# Patient Record
Sex: FEMALE | Race: WHITE | Employment: OTHER | URBAN - METROPOLITAN AREA
[De-identification: names, ages, dates, MRNs, and addresses within clinical notes are randomized per-mention and may not be internally consistent; named-entity substitution may affect disease eponyms.]

---

## 2017-03-13 ENCOUNTER — TELEPHONE (OUTPATIENT)
Dept: INTERNAL MEDICINE CLINIC | Facility: CLINIC | Age: 52
End: 2017-03-13

## 2017-03-13 NOTE — TELEPHONE ENCOUNTER
Pt called in requesting a copy of her px to please be mailed to her. (Address on file verified). Pt is asking if this can be done asap. Pt also asking for a call back once the px is placed in the mailbox, please.

## 2018-11-13 ENCOUNTER — MED REC SCAN ONLY (OUTPATIENT)
Dept: INTERNAL MEDICINE CLINIC | Facility: CLINIC | Age: 53
End: 2018-11-13

## 2019-03-12 ENCOUNTER — OFFICE VISIT (OUTPATIENT)
Dept: INTERNAL MEDICINE CLINIC | Facility: CLINIC | Age: 54
End: 2019-03-12
Payer: COMMERCIAL

## 2019-03-12 VITALS
BODY MASS INDEX: 33.21 KG/M2 | DIASTOLIC BLOOD PRESSURE: 74 MMHG | TEMPERATURE: 98 F | SYSTOLIC BLOOD PRESSURE: 112 MMHG | WEIGHT: 211.63 LBS | HEIGHT: 67 IN | HEART RATE: 82 BPM

## 2019-03-12 DIAGNOSIS — Z00.00 PHYSICAL EXAM: Primary | ICD-10-CM

## 2019-03-12 DIAGNOSIS — Z12.31 VISIT FOR SCREENING MAMMOGRAM: ICD-10-CM

## 2019-03-12 DIAGNOSIS — Z12.11 SCREENING FOR COLON CANCER: ICD-10-CM

## 2019-03-12 DIAGNOSIS — Z12.4 CERVICAL CANCER SCREENING: ICD-10-CM

## 2019-03-12 DIAGNOSIS — R10.11 RUQ PAIN: ICD-10-CM

## 2019-03-12 PROCEDURE — 99396 PREV VISIT EST AGE 40-64: CPT | Performed by: INTERNAL MEDICINE

## 2019-03-13 LAB — HPV I/H RISK 1 DNA SPEC QL NAA+PROBE: NEGATIVE

## 2019-03-22 NOTE — PROGRESS NOTES
HPI:    Patient ID: Mukul Hamilton is a 47year old female.     HPI  Here for physical exam  Due for colonoscopy mammogram   Brother diagnosed with colon cancer prior to age 48    /74 (BP Location: Right arm, Patient Position: Sitting, Cuff Size: large) Maternal Grandmother    • Other (Other [Other]) Paternal Grandmother         Parkinson's disease   • Heart Disease Maternal Grandfather         CAD   • Heart Disease Maternal Grandmother         CAD   • Arthritis Father         rheumatoid      Social Histo ASSESSMENT/PLAN:   (Z00.00) Physical exam  (primary encounter diagnosis)  Plan: ASSAY, THYROID STIM HORMONE, FREE T4 (FREE         THYROXINE), LIPID PANEL, CBC, PLATELET; NO         DIFFERENTIAL, COMP METABOLIC PANEL (14),         HEMOGLOBIN A1C, URINE M

## 2019-04-17 ENCOUNTER — APPOINTMENT (OUTPATIENT)
Dept: LAB | Age: 54
End: 2019-04-17
Attending: INTERNAL MEDICINE
Payer: COMMERCIAL

## 2019-04-17 DIAGNOSIS — Z00.00 PHYSICAL EXAM: ICD-10-CM

## 2019-04-17 PROCEDURE — 84439 ASSAY OF FREE THYROXINE: CPT

## 2019-04-17 PROCEDURE — 93005 ELECTROCARDIOGRAM TRACING: CPT

## 2019-04-17 PROCEDURE — 83036 HEMOGLOBIN GLYCOSYLATED A1C: CPT

## 2019-04-17 PROCEDURE — 81015 MICROSCOPIC EXAM OF URINE: CPT

## 2019-04-17 PROCEDURE — 84443 ASSAY THYROID STIM HORMONE: CPT

## 2019-04-17 PROCEDURE — 85027 COMPLETE CBC AUTOMATED: CPT

## 2019-04-17 PROCEDURE — 36415 COLL VENOUS BLD VENIPUNCTURE: CPT

## 2019-04-17 PROCEDURE — 80053 COMPREHEN METABOLIC PANEL: CPT

## 2019-04-17 PROCEDURE — 80061 LIPID PANEL: CPT

## 2019-04-17 PROCEDURE — 93010 ELECTROCARDIOGRAM REPORT: CPT | Performed by: INTERNAL MEDICINE

## 2019-04-20 ENCOUNTER — HOSPITAL ENCOUNTER (OUTPATIENT)
Dept: MAMMOGRAPHY | Age: 54
Discharge: HOME | End: 2019-04-20
Attending: INTERNAL MEDICINE
Payer: COMMERCIAL

## 2019-04-20 DIAGNOSIS — Z12.31 VISIT FOR SCREENING MAMMOGRAM: ICD-10-CM

## 2019-04-20 PROCEDURE — 77067 SCR MAMMO BI INCL CAD: CPT | Performed by: INTERNAL MEDICINE

## 2019-04-20 PROCEDURE — 77063 BREAST TOMOSYNTHESIS BI: CPT | Performed by: INTERNAL MEDICINE

## 2019-04-22 ENCOUNTER — HOSPITAL ENCOUNTER (OUTPATIENT)
Dept: ULTRASOUND IMAGING | Age: 54
Discharge: HOME | End: 2019-04-22
Attending: INTERNAL MEDICINE
Payer: COMMERCIAL

## 2019-04-22 DIAGNOSIS — R10.11 RUQ PAIN: ICD-10-CM

## 2019-04-22 PROCEDURE — 76705 ECHO EXAM OF ABDOMEN: CPT | Performed by: INTERNAL MEDICINE

## 2019-04-30 ENCOUNTER — HOSPITAL ENCOUNTER (OUTPATIENT)
Dept: MAMMOGRAPHY | Facility: HOSPITAL | Age: 54
Discharge: HOME | End: 2019-04-30
Attending: INTERNAL MEDICINE
Payer: COMMERCIAL

## 2019-04-30 DIAGNOSIS — R92.8 ABNORMAL MAMMOGRAM: ICD-10-CM

## 2019-04-30 PROCEDURE — 77065 DX MAMMO INCL CAD UNI: CPT | Performed by: INTERNAL MEDICINE

## 2019-04-30 PROCEDURE — 77061 BREAST TOMOSYNTHESIS UNI: CPT | Performed by: INTERNAL MEDICINE

## 2020-02-06 ENCOUNTER — HOSPITAL ENCOUNTER (OUTPATIENT)
Age: 55
Discharge: ANOTHER HOSPITAL | End: 2020-02-06
Attending: EMERGENCY MEDICINE
Payer: COMMERCIAL

## 2020-02-06 VITALS
SYSTOLIC BLOOD PRESSURE: 154 MMHG | WEIGHT: 211 LBS | RESPIRATION RATE: 20 BRPM | BODY MASS INDEX: 33 KG/M2 | HEART RATE: 73 BPM | TEMPERATURE: 99 F | OXYGEN SATURATION: 99 % | DIASTOLIC BLOOD PRESSURE: 81 MMHG

## 2020-02-06 DIAGNOSIS — R07.89 CHEST PRESSURE: Primary | ICD-10-CM

## 2020-02-06 DIAGNOSIS — J11.1 INFLUENZA: ICD-10-CM

## 2020-02-06 DIAGNOSIS — R00.2 PALPITATIONS: ICD-10-CM

## 2020-02-06 LAB
POCT INFLUENZA A: POSITIVE
POCT INFLUENZA B: NEGATIVE

## 2020-02-06 PROCEDURE — 99214 OFFICE O/P EST MOD 30 MIN: CPT

## 2020-02-06 PROCEDURE — 93010 ELECTROCARDIOGRAM REPORT: CPT

## 2020-02-06 PROCEDURE — 93005 ELECTROCARDIOGRAM TRACING: CPT

## 2020-02-06 PROCEDURE — 99204 OFFICE O/P NEW MOD 45 MIN: CPT

## 2020-02-06 PROCEDURE — 93010 ELECTROCARDIOGRAM REPORT: CPT | Performed by: EMERGENCY MEDICINE

## 2020-02-06 PROCEDURE — 87502 INFLUENZA DNA AMP PROBE: CPT | Performed by: EMERGENCY MEDICINE

## 2020-02-06 NOTE — ED INITIAL ASSESSMENT (HPI)
C/o fever  For 4 days  Heart feels \"fluttery\"  Started today  With chest pressure but denies chest pain

## 2020-02-06 NOTE — ED PROVIDER NOTES
Patient Seen in: [facility] In [address] [place] [address]      History   Patient presents with:  Fever    Stated Complaint: flu sx    HPI  Started with a tactile fever cough runny nose body aches approximately 5 days ago. Cough is been mild and dry. Mouth/Throat:      Mouth: Mucous membranes are moist.      Pharynx: No oropharyngeal exudate or posterior oropharyngeal erythema.    Eyes:      Conjunctiva/sclera: Conjunctivae normal.   Neck:      Musculoskeletal: Normal range of motion and neck suppl She insists on driving herself. She would like to go to [place]. She was offered recommendations for closer hospitals but she declined. Patient states she is calling her  now.           Disposition and Plan     Clinical Impression:  Chest pressure

## 2020-02-18 ENCOUNTER — TELEPHONE (OUTPATIENT)
Dept: OTHER | Age: 55
End: 2020-02-18

## 2020-02-18 NOTE — TELEPHONE ENCOUNTER
Pt calling to get a f/u appt with Dr. Brittney Cruz for elevated BP and pos. Flu. Pt states BP today 132/91. HR 79. BP at  220 and 180 pt sent to ED where she was monitored. Denies any other symptoms. Advised to make appt to be evaluated for BP.  Pt agreed to

## 2020-02-19 ENCOUNTER — OFFICE VISIT (OUTPATIENT)
Dept: INTERNAL MEDICINE CLINIC | Facility: CLINIC | Age: 55
End: 2020-02-19
Payer: COMMERCIAL

## 2020-02-19 ENCOUNTER — TELEPHONE (OUTPATIENT)
Dept: OTHER | Age: 55
End: 2020-02-19

## 2020-02-19 ENCOUNTER — HOSPITAL ENCOUNTER (OUTPATIENT)
Dept: GENERAL RADIOLOGY | Age: 55
Discharge: HOME | End: 2020-02-19
Attending: INTERNAL MEDICINE
Payer: COMMERCIAL

## 2020-02-19 VITALS
HEART RATE: 80 BPM | SYSTOLIC BLOOD PRESSURE: 133 MMHG | WEIGHT: 210 LBS | DIASTOLIC BLOOD PRESSURE: 83 MMHG | HEIGHT: 67 IN | BODY MASS INDEX: 32.96 KG/M2 | TEMPERATURE: 98 F | OXYGEN SATURATION: 98 %

## 2020-02-19 DIAGNOSIS — R03.0 ELEVATED BLOOD PRESSURE READING: ICD-10-CM

## 2020-02-19 DIAGNOSIS — J11.1 INFLUENZA: Primary | ICD-10-CM

## 2020-02-19 DIAGNOSIS — R05.9 COUGH: ICD-10-CM

## 2020-02-19 PROBLEM — I10 HYPERTENSION GOAL BP (BLOOD PRESSURE) < 130/80: Status: ACTIVE | Noted: 2020-02-19

## 2020-02-19 PROCEDURE — 71046 X-RAY EXAM CHEST 2 VIEWS: CPT | Performed by: INTERNAL MEDICINE

## 2020-02-19 PROCEDURE — 99213 OFFICE O/P EST LOW 20 MIN: CPT | Performed by: INTERNAL MEDICINE

## 2020-02-19 RX ORDER — ALBUTEROL SULFATE 90 UG/1
2 AEROSOL, METERED RESPIRATORY (INHALATION) EVERY 4 HOURS PRN
Qty: 1 INHALER | Refills: 6 | Status: SHIPPED | OUTPATIENT
Start: 2020-02-19 | End: 2020-02-19

## 2020-02-19 RX ORDER — ALBUTEROL SULFATE 90 UG/1
2 AEROSOL, METERED RESPIRATORY (INHALATION) EVERY 4 HOURS PRN
Qty: 1 INHALER | Refills: 6 | Status: CANCELLED | OUTPATIENT
Start: 2020-02-19

## 2020-02-19 RX ORDER — CIPROFLOXACIN HYDROCHLORIDE 3.5 MG/ML
SOLUTION/ DROPS TOPICAL
COMMUNITY
Start: 2020-02-15 | End: 2020-02-22

## 2020-02-19 RX ORDER — ALBUTEROL SULFATE 90 UG/1
2 AEROSOL, METERED RESPIRATORY (INHALATION) EVERY 4 HOURS PRN
Qty: 1 INHALER | Refills: 6 | Status: SHIPPED | OUTPATIENT
Start: 2020-02-19 | End: 2020-08-18

## 2020-02-19 NOTE — PATIENT INSTRUCTIONS
1. Please purchase a blood pressure monitor, if you don't already own one, and record your blood pressure and heart rate 1-2 times daily on the provided log.  The more values you provide at the end of the month the easier it will be to adjust your medic High blood pressure occurs when blood pushes too hard against artery walls. This causes damage to the artery walls and then the formation of scar tissue as it heals. This makes the arteries stiff and weak.  Plaque sticks to the scarred tissue narrowing and An example of a blood pressure measurement is 120/70 (120 over 70). The top number is the pressure of blood against the artery walls during a heartbeat (systolic).  The bottom number is the pressure of blood against artery walls between heartbeats (diastoli · Limit alcohol. Drinking too much alcohol can raise blood pressure. Men should have no more than 2 drinks a day. Women should have no more than 1. (A drink is equal to 1 beer, or a small glass of wine, or a shot of liquor.)  · Control stress.  Stress makes Blood pressure measurements are given as 2 numbers. Systolic blood pressure is the upper number. This is the pressure when the heart contracts. Diastolic blood pressure is the lower number. This is the pressure when the heart relaxes between beats.  You ga · Start an exercise program. Talk with your healthcare provider about what exercise program is best for you. It doesn’t have to be difficult. Even brisk walking for 20 minutes 3 times a week is a good form of exercise.   · Avoid medicines that stimulates th · Relax for 5 minutes before taking the measurement. · Sit correctly. Be sure your back is supported. Don't sit on a couch or soft chair. Uncross your feet and place them flat on the floor.  Place your arm on a solid, flat surface like a table with the upp · Dizziness or dizziness with spinning sensation (vertigo)  · Weakness in an arm or leg or on one side of the face  · Trouble speaking or seeing   Controlling High Blood Pressure  High blood pressure (hypertension) is often called the silent killer.  This i · When you go to a restaurant, ask that your meal be prepared with no added salt. Maintain a healthy weight  · Ask your healthcare provider how many calories to eat a day. Then stick to that number.   · Ask your healthcare provider what weight range is h Eating salt (sodium) can make your body retain too much water. Excess water makes your heart work harder. Canned, packaged, and frozen foods are easy to prepare. But they are often high in sodium.  Here are some ideas for low-salt foods you can easily make

## 2020-02-19 NOTE — PROGRESS NOTES
History of Present Illness   Patient ID: Mine Chow is a 49year old female.   Chief Complaint: Hospital F/U [facility] ER ffup- Chest pain, influenza, HTN. )    PMH mitral valve prolapse  2/6/2020 patient seen in urgent care with complaints of fever, Eyes: Conjunctivae are normal. Right eye exhibits no discharge. Left eye exhibits no discharge. Neck: Neck supple. Cardiovascular: Normal rate, regular rhythm, normal heart sounds and intact distal pulses. Exam reveals no gallop. No murmur heard. Tobacco smoker: No      Systolic Blood Pressure: 585 mmHg      Is BP treated: No      HDL Cholesterol: 57 mg/dL      Total Cholesterol: 166 mg/dL    Medical History    Reviewed Active Problems:  Patient Active Problem List    Elevated blood pressure re Overall improving, flu symptoms are starting to resolve. She does complain of a cough and is concerned about pneumonia and would like a chest x-ray. Patient reassured but will order chest x-ray for comfort.   Continue with symptomatic treatment, given pro 3. Your blood pressure goal is top number 120-130 mmHg  and bottom number 70-80 mmHg; ie 120-130 mmHg / 70-80 mmHg.   Blood pressure that stays consistently within this range reduces your risk of stroke, heart attack, heart disease, kidney disease, eye dise It's important to know your blood pressure numbers. Blood pressure measurements are given as 2 numbers. Systolic blood pressure is the upper number. This is the pressure when the heart contracts. Diastolic blood pressure is the lower number.  This is the pr · Choose heart-healthy foods. Eating healthier meals helps you control your blood pressure. Ask your doctor about the DASH eating plan. This plan helps reduce blood pressure by limiting the amount of sodium (salt) you have in your diet.  DASH also encourage · Medicine is only one part of controlling high blood pressure. You also need to manage your weight, get regular exercise, and adjust your eating habits. · High blood pressure is usually a lifelong problem.  But it can be controlled with healthy lifestyle · Stage 2 high blood pressure is when systolic is 443 or higher or the diastolic is 90 or higher  Uncontrolled high blood pressure can cause serious health problems. It raises your risk for heart attack, stroke, and heart failure.  In general, if you have h · Learn how to handle stress better. This is an important part of any program to lower blood pressure. Learn ways to relax. These include meditation, yoga, and biofeedback. · If medicines were prescribed, take them exactly as directed.  Missing doses may c · Call your provider if you have several high readings. Don't be frightened by a single high reading, but if you get several high readings, check in with your healthcare provider.   · Note: When blood pressure reaches a systolic (top number) of 687 or highe Blood pressure is categorized as normal, elevated, or stage 1 or stage 2 high blood pressure:  · Normal blood pressure is systolic of less than 593 and diastolic of less than 80 (120/80)  · Elevated blood pressure is systolic of 607 to 180 and diastolic le · Be active at a moderate to vigorous level of physical activity for at least 40 minutes for a minimum of 3 to 4 days a week. Manage stress  · Make time to relax and enjoy life. Find time to laugh.   · Communicate your concerns with your loved ones and · Unsalted fresh fruit and vegetables, or frozen or canned fruit and vegetables with no added salt  Stay away from:  · Lunch or deli meat that is cured or smoked  · Cheese  · Tomato juice and ketchup  · Canned vegetables, soups, and fish not labeled as no-

## 2020-02-19 NOTE — TELEPHONE ENCOUNTER
Verified name and .   Results/recommendations reviewed with pt and pt verb understanding                    Notes recorded by Abimbola Muller on 2020 at 2:45 PM CST  Called pt and lmtcb.  ------    Notes recorded by Catina Reilly MD on 2020 a

## 2020-02-20 ENCOUNTER — TELEPHONE (OUTPATIENT)
Dept: INTERNAL MEDICINE CLINIC | Facility: CLINIC | Age: 55
End: 2020-02-20

## 2020-02-20 DIAGNOSIS — Z12.31 VISIT FOR SCREENING MAMMOGRAM: Primary | ICD-10-CM

## 2020-02-20 NOTE — TELEPHONE ENCOUNTER
I had called pt regarding cxr results, pt was already aware. Dr. Sue Edouard Pt would like to know if she can have Mammogram order she is due in April and will schedule then. Pt would also like any annual orders ordered.      Last Mammo 4/24/2019   Notes rec

## 2020-02-21 NOTE — TELEPHONE ENCOUNTER
Pt is also requesting lab orders . Please advise , Pt does not have a Px appointment scheduled yet .

## 2020-02-24 NOTE — TELEPHONE ENCOUNTER
Attempted to reach patient no answer, voicemail left informing patient labs and mammogram have been ordered.

## 2020-03-23 ENCOUNTER — NURSE TRIAGE (OUTPATIENT)
Dept: INTERNAL MEDICINE CLINIC | Facility: CLINIC | Age: 55
End: 2020-03-23

## 2020-03-23 DIAGNOSIS — R07.89 CHEST PRESSURE: ICD-10-CM

## 2020-03-23 DIAGNOSIS — F41.9 ANXIETY: ICD-10-CM

## 2020-03-23 DIAGNOSIS — I49.1 PREMATURE ATRIAL CONTRACTIONS: ICD-10-CM

## 2020-03-23 DIAGNOSIS — I10 HYPERTENSION GOAL BP (BLOOD PRESSURE) < 130/80: Primary | ICD-10-CM

## 2020-03-23 PROCEDURE — 99443 PHONE E/M BY PHYS 21-30 MIN: CPT | Performed by: INTERNAL MEDICINE

## 2020-03-23 NOTE — TELEPHONE ENCOUNTER
Action Requested: Summary for Provider     []  Critical Lab, Recommendations Needed  [x] Need Additional Advice  []   FYI    []   Need Orders  [] Need Medications Sent to Pharmacy  []  Other     SUMMARY: Patient calling with complaint of intermittent chest

## 2020-03-24 PROBLEM — F41.9 ANXIETY: Status: ACTIVE | Noted: 2020-03-24

## 2020-03-24 PROBLEM — I10 HYPERTENSION GOAL BP (BLOOD PRESSURE) < 130/80: Status: ACTIVE | Noted: 2020-03-24

## 2020-03-24 PROBLEM — I49.1 PREMATURE ATRIAL CONTRACTIONS: Status: ACTIVE | Noted: 2020-03-24

## 2020-03-24 RX ORDER — AMLODIPINE BESYLATE 5 MG/1
5 TABLET ORAL NIGHTLY
Qty: 90 TABLET | Refills: 1 | Status: SHIPPED | OUTPATIENT
Start: 2020-03-24 | End: 2020-08-20

## 2020-03-24 NOTE — TELEPHONE ENCOUNTER
Pt calling back states has not gotten the messages from [address] states was by mobile phone all night and did not notice any VM. Advised pt to check volume make sure she is available pt states she will. Mobile # [phone].

## 2020-03-24 NOTE — TELEPHONE ENCOUNTER
Virtual/Telephone Check-In    Tanja Keyannajenna Manuelerrol Eda verbally consents to a Virtual/Telephone Check-In service on 03/24/20. Patient understands and accepts financial responsibility for any deductible, co-insurance and/or co-pays associated with this service. light midsternal nonradiating chest pressure, \"cat sitting on my chest \", intermittent, occurs randomly, no relation to activity, no leg swelling, no other symptoms, no relation to elevated blood pressure.  She feels anxiety/chest pressure when she thinks 3. Anxiety  4. Chest pressure  Plan  New problem. Discussed relaxation techniques which she is agreeable to. ffup as needed, offered limited dose xanax to see if that helps her symptoms but she politely declines.   Advised chest pressure could be due t

## 2020-08-18 DIAGNOSIS — I10 HYPERTENSION GOAL BP (BLOOD PRESSURE) < 130/80: ICD-10-CM

## 2020-08-18 DIAGNOSIS — R05.9 COUGH: ICD-10-CM

## 2020-08-19 ENCOUNTER — LAB ENCOUNTER (OUTPATIENT)
Dept: LAB | Facility: REFERENCE LAB | Age: 55
End: 2020-08-19
Attending: INTERNAL MEDICINE
Payer: COMMERCIAL

## 2020-08-19 ENCOUNTER — PATIENT MESSAGE (OUTPATIENT)
Dept: OTHER | Age: 55
End: 2020-08-19

## 2020-08-19 ENCOUNTER — NURSE TRIAGE (OUTPATIENT)
Dept: INTERNAL MEDICINE CLINIC | Facility: CLINIC | Age: 55
End: 2020-08-19

## 2020-08-19 DIAGNOSIS — Z00.00 PHYSICAL EXAM: ICD-10-CM

## 2020-08-19 LAB
ALBUMIN SERPL-MCNC: 3.5 G/DL (ref 3.4–5)
ALBUMIN/GLOB SERPL: 0.9 {RATIO} (ref 1–2)
ALP LIVER SERPL-CCNC: 75 U/L (ref 41–108)
ALT SERPL-CCNC: 18 U/L (ref 13–56)
ANION GAP SERPL CALC-SCNC: 3 MMOL/L (ref 0–18)
AST SERPL-CCNC: 10 U/L (ref 15–37)
BACTERIA UR QL AUTO: NEGATIVE /HPF
BILIRUB SERPL-MCNC: 0.4 MG/DL (ref 0.1–2)
BUN BLD-MCNC: 13 MG/DL (ref 7–18)
BUN/CREAT SERPL: 15.5 (ref 10–20)
CALCIUM BLD-MCNC: 9.3 MG/DL (ref 8.5–10.1)
CHLORIDE SERPL-SCNC: 108 MMOL/L (ref 98–112)
CHOLEST SMN-MCNC: 174 MG/DL (ref ?–200)
CO2 SERPL-SCNC: 30 MMOL/L (ref 21–32)
CREAT BLD-MCNC: 0.84 MG/DL (ref 0.55–1.02)
DEPRECATED RDW RBC AUTO: 40.4 FL (ref 35.1–46.3)
ERYTHROCYTE [DISTWIDTH] IN BLOOD BY AUTOMATED COUNT: 12.6 % (ref 11–15)
EST. AVERAGE GLUCOSE BLD GHB EST-MCNC: 100 MG/DL (ref 68–126)
GLOBULIN PLAS-MCNC: 3.8 G/DL (ref 2.8–4.4)
GLUCOSE BLD-MCNC: 85 MG/DL (ref 70–99)
HBA1C MFR BLD HPLC: 5.1 % (ref ?–5.7)
HCT VFR BLD AUTO: 37.8 % (ref 35–48)
HDLC SERPL-MCNC: 49 MG/DL (ref 40–59)
HGB BLD-MCNC: 12.6 G/DL (ref 12–16)
LDLC SERPL CALC-MCNC: 107 MG/DL (ref ?–100)
M PROTEIN MFR SERPL ELPH: 7.3 G/DL (ref 6.4–8.2)
MCH RBC QN AUTO: 29.3 PG (ref 26–34)
MCHC RBC AUTO-ENTMCNC: 33.3 G/DL (ref 31–37)
MCV RBC AUTO: 87.9 FL (ref 80–100)
NONHDLC SERPL-MCNC: 125 MG/DL (ref ?–130)
OSMOLALITY SERPL CALC.SUM OF ELEC: 291 MOSM/KG (ref 275–295)
PATIENT FASTING Y/N/NP: YES
PATIENT FASTING Y/N/NP: YES
PLATELET # BLD AUTO: 290 10(3)UL (ref 150–450)
POTASSIUM SERPL-SCNC: 4.1 MMOL/L (ref 3.5–5.1)
RBC # BLD AUTO: 4.3 X10(6)UL (ref 3.8–5.3)
RBC #/AREA URNS AUTO: 1 /HPF
SODIUM SERPL-SCNC: 141 MMOL/L (ref 136–145)
T4 FREE SERPL-MCNC: 1 NG/DL (ref 0.8–1.7)
TRIGL SERPL-MCNC: 89 MG/DL (ref 30–149)
TSI SER-ACNC: 4.57 MIU/ML (ref 0.36–3.74)
VLDLC SERPL CALC-MCNC: 18 MG/DL (ref 0–30)
WBC # BLD AUTO: 7.7 X10(3) UL (ref 4–11)
WBC #/AREA URNS AUTO: 3 /HPF

## 2020-08-19 PROCEDURE — 85027 COMPLETE CBC AUTOMATED: CPT

## 2020-08-19 PROCEDURE — 36415 COLL VENOUS BLD VENIPUNCTURE: CPT

## 2020-08-19 PROCEDURE — 83036 HEMOGLOBIN GLYCOSYLATED A1C: CPT

## 2020-08-19 PROCEDURE — 80061 LIPID PANEL: CPT

## 2020-08-19 PROCEDURE — 84443 ASSAY THYROID STIM HORMONE: CPT

## 2020-08-19 PROCEDURE — 80053 COMPREHEN METABOLIC PANEL: CPT

## 2020-08-19 PROCEDURE — 81015 MICROSCOPIC EXAM OF URINE: CPT

## 2020-08-19 PROCEDURE — 84439 ASSAY OF FREE THYROXINE: CPT

## 2020-08-19 NOTE — TELEPHONE ENCOUNTER
Patient sent [employer] message stating I think I'm suffering a side effect of my blood pressure medicine (I have an itchy \"hot\" rash on the back of my head please advise

## 2020-08-20 ENCOUNTER — OFFICE VISIT (OUTPATIENT)
Dept: FAMILY MEDICINE CLINIC | Facility: CLINIC | Age: 55
End: 2020-08-20
Payer: COMMERCIAL

## 2020-08-20 VITALS
HEART RATE: 79 BPM | TEMPERATURE: 97 F | HEIGHT: 66.4 IN | WEIGHT: 202 LBS | SYSTOLIC BLOOD PRESSURE: 129 MMHG | DIASTOLIC BLOOD PRESSURE: 79 MMHG | RESPIRATION RATE: 16 BRPM | BODY MASS INDEX: 32.08 KG/M2

## 2020-08-20 DIAGNOSIS — I10 HYPERTENSION GOAL BP (BLOOD PRESSURE) < 130/80: ICD-10-CM

## 2020-08-20 DIAGNOSIS — R21 RASH AND NONSPECIFIC SKIN ERUPTION: ICD-10-CM

## 2020-08-20 PROCEDURE — 99213 OFFICE O/P EST LOW 20 MIN: CPT | Performed by: FAMILY MEDICINE

## 2020-08-20 PROCEDURE — 3008F BODY MASS INDEX DOCD: CPT | Performed by: FAMILY MEDICINE

## 2020-08-20 PROCEDURE — 3078F DIAST BP <80 MM HG: CPT | Performed by: FAMILY MEDICINE

## 2020-08-20 PROCEDURE — 3074F SYST BP LT 130 MM HG: CPT | Performed by: FAMILY MEDICINE

## 2020-08-20 RX ORDER — AMLODIPINE BESYLATE 5 MG/1
5 TABLET ORAL NIGHTLY
Qty: 90 TABLET | Refills: 0 | Status: SHIPPED | OUTPATIENT
Start: 2020-08-20 | End: 2020-11-12

## 2020-08-20 RX ORDER — METHYLPREDNISOLONE 4 MG/1
TABLET ORAL
Qty: 1 KIT | Refills: 0 | Status: SHIPPED | OUTPATIENT
Start: 2020-08-20 | End: 2020-09-09 | Stop reason: ALTCHOICE

## 2020-08-20 RX ORDER — AMLODIPINE BESYLATE 5 MG/1
5 TABLET ORAL NIGHTLY
Qty: 90 TABLET | Refills: 1 | Status: CANCELLED | OUTPATIENT
Start: 2020-08-20

## 2020-08-20 NOTE — TELEPHONE ENCOUNTER
Patient forgot her amlodipine at her parents and needs a refill. Has not taken medication for 5 days. Please advise.

## 2020-08-20 NOTE — PROGRESS NOTES
HPI:    Patient ID: Lovely French is a 49year old female. Pt presents with an itchy rash on the back of the scalp for the last several weeks. No new topical agents or ingestions. Pt has tried otc remedies without consistent relief.  No fevers or ac placed in this encounter. Meds This Visit:  Requested Prescriptions     Signed Prescriptions Disp Refills   • methylPREDNISolone (MEDROL) 4 MG Oral Tablet Therapy Pack 1 kit 0     Sig: As directed.    • amLODIPine Besylate 5 MG Oral Tab 90 tablet 0

## 2020-08-20 NOTE — TELEPHONE ENCOUNTER
Action Requested: Summary for Provider     []  Critical Lab, Recommendations Needed  [] Need Additional Advice  [x]   FYI    []   Need Orders  [] Need Medications Sent to Pharmacy  []  Other     SUMMARY: For the past 1 month patient has had a rash in the b

## 2020-08-21 RX ORDER — ALBUTEROL SULFATE 90 UG/1
2 AEROSOL, METERED RESPIRATORY (INHALATION) EVERY 4 HOURS PRN
Qty: 1 INHALER | Refills: 6 | Status: SHIPPED | OUTPATIENT
Start: 2020-08-21

## 2020-08-25 ENCOUNTER — HOSPITAL ENCOUNTER (OUTPATIENT)
Dept: MAMMOGRAPHY | Age: 55
Discharge: HOME | End: 2020-08-25
Attending: INTERNAL MEDICINE
Payer: COMMERCIAL

## 2020-08-25 DIAGNOSIS — Z12.31 VISIT FOR SCREENING MAMMOGRAM: ICD-10-CM

## 2020-08-25 PROCEDURE — 77067 SCR MAMMO BI INCL CAD: CPT | Performed by: INTERNAL MEDICINE

## 2020-08-25 PROCEDURE — 77063 BREAST TOMOSYNTHESIS BI: CPT | Performed by: INTERNAL MEDICINE

## 2020-09-09 ENCOUNTER — OFFICE VISIT (OUTPATIENT)
Dept: INTERNAL MEDICINE CLINIC | Facility: CLINIC | Age: 55
End: 2020-09-09
Payer: COMMERCIAL

## 2020-09-09 VITALS
HEIGHT: 67 IN | TEMPERATURE: 98 F | BODY MASS INDEX: 32.02 KG/M2 | DIASTOLIC BLOOD PRESSURE: 76 MMHG | WEIGHT: 204 LBS | HEART RATE: 67 BPM | SYSTOLIC BLOOD PRESSURE: 116 MMHG

## 2020-09-09 DIAGNOSIS — Z12.11 COLON CANCER SCREENING: ICD-10-CM

## 2020-09-09 DIAGNOSIS — E03.9 HYPOTHYROIDISM, UNSPECIFIED TYPE: ICD-10-CM

## 2020-09-09 DIAGNOSIS — Z00.00 ROUTINE GENERAL MEDICAL EXAMINATION AT A HEALTH CARE FACILITY: Primary | ICD-10-CM

## 2020-09-09 DIAGNOSIS — R21 RASH: ICD-10-CM

## 2020-09-09 PROBLEM — R03.0 ELEVATED BLOOD PRESSURE READING: Status: RESOLVED | Noted: 2020-02-19 | Resolved: 2020-09-09

## 2020-09-09 PROCEDURE — 3074F SYST BP LT 130 MM HG: CPT | Performed by: INTERNAL MEDICINE

## 2020-09-09 PROCEDURE — 99396 PREV VISIT EST AGE 40-64: CPT | Performed by: INTERNAL MEDICINE

## 2020-09-09 PROCEDURE — 3078F DIAST BP <80 MM HG: CPT | Performed by: INTERNAL MEDICINE

## 2020-09-09 PROCEDURE — 3008F BODY MASS INDEX DOCD: CPT | Performed by: INTERNAL MEDICINE

## 2020-09-09 RX ORDER — LEVOTHYROXINE SODIUM 0.03 MG/1
25 TABLET ORAL
Qty: 90 TABLET | Refills: 1 | Status: SHIPPED | OUTPATIENT
Start: 2020-09-09 | End: 2021-03-13

## 2020-09-09 RX ORDER — TRIAMCINOLONE ACETONIDE 0.25 MG/ML
LOTION TOPICAL
Qty: 60 ML | Refills: 0 | Status: SHIPPED | OUTPATIENT
Start: 2020-09-09

## 2020-09-15 ENCOUNTER — MED REC SCAN ONLY (OUTPATIENT)
Dept: INTERNAL MEDICINE CLINIC | Facility: CLINIC | Age: 55
End: 2020-09-15

## 2020-09-20 NOTE — PROGRESS NOTES
HPI:    Patient ID: Jann Najera is a 49year old female.     HPI    Physical exam    Generally healthy    /76 (BP Location: Left arm, Patient Position: Sitting, Cuff Size: large)   Pulse 67   Temp 98 °F (36.7 °C) (Oral)   Ht 5' 7\" (1.702 m) Oral Tab Take 1 tablet (5 mg total) by mouth nightly. FOR BLOOD PRESSURE. 90 tablet 0   • Albuterol Sulfate HFA (PROAIR HFA) 108 (90 Base) MCG/ACT Inhalation Aero Soln Inhale 2 puffs into the lungs every 4 (four) hours as needed for Wheezing.  (Patient not distension (obese) and no mass. There is no abdominal tenderness. Genitourinary:    Genitourinary Comments: . Breast exam.  Bilateral symmetric  No discrete palpable masses or tenderness  No nipple discharge  And no axillary adenopathy.        Musculoskele

## 2020-10-23 ENCOUNTER — MED REC SCAN ONLY (OUTPATIENT)
Dept: INTERNAL MEDICINE CLINIC | Facility: CLINIC | Age: 55
End: 2020-10-23

## 2020-11-12 DIAGNOSIS — I10 HYPERTENSION GOAL BP (BLOOD PRESSURE) < 130/80: ICD-10-CM

## 2020-11-12 RX ORDER — AMLODIPINE BESYLATE 5 MG/1
5 TABLET ORAL NIGHTLY
Qty: 90 TABLET | Refills: 0 | Status: SHIPPED | OUTPATIENT
Start: 2020-11-12

## 2020-12-03 ENCOUNTER — NURSE TRIAGE (OUTPATIENT)
Dept: INTERNAL MEDICINE CLINIC | Facility: CLINIC | Age: 55
End: 2020-12-03

## 2020-12-03 NOTE — TELEPHONE ENCOUNTER
Action Requested: Summary for Provider     []  Critical Lab, Recommendations Needed  [x] Need Additional Advice  []   FYI    []   Need Orders  [x] Need Medications Sent to Pharmacy  []  Other     SUMMARY:   Reason for call: Dizziness  Onset: Data Unavailab

## 2021-02-15 ENCOUNTER — TELEPHONE (OUTPATIENT)
Dept: INTERNAL MEDICINE CLINIC | Facility: CLINIC | Age: 56
End: 2021-02-15

## 2021-02-16 ENCOUNTER — TELEPHONE (OUTPATIENT)
Dept: INTERNAL MEDICINE CLINIC | Facility: CLINIC | Age: 56
End: 2021-02-16

## 2021-02-16 NOTE — TELEPHONE ENCOUNTER
Called pt name and  verified informed pt dr Robert Molina had filled out forms and were ready for  on second floor reception. Patient  verbalized understanding per pt said she would either pick them today Tuesday, or .

## 2021-03-12 DIAGNOSIS — Z23 NEED FOR VACCINATION: ICD-10-CM

## 2021-03-13 DIAGNOSIS — E03.9 HYPOTHYROIDISM, UNSPECIFIED TYPE: ICD-10-CM

## 2021-03-13 RX ORDER — LEVOTHYROXINE SODIUM 0.03 MG/1
25 TABLET ORAL
Qty: 90 TABLET | Refills: 1 | Status: SHIPPED | OUTPATIENT
Start: 2021-03-13 | End: 2021-12-15

## 2021-06-17 ENCOUNTER — HOSPITAL ENCOUNTER (EMERGENCY)
Facility: HOSPITAL | Age: 56
Discharge: HOME | End: 2021-06-17
Attending: EMERGENCY MEDICINE
Payer: COMMERCIAL

## 2021-06-17 ENCOUNTER — APPOINTMENT (OUTPATIENT)
Dept: GENERAL RADIOLOGY | Facility: HOSPITAL | Age: 56
End: 2021-06-17
Attending: EMERGENCY MEDICINE
Payer: COMMERCIAL

## 2021-06-17 VITALS
RESPIRATION RATE: 14 BRPM | HEART RATE: 59 BPM | DIASTOLIC BLOOD PRESSURE: 86 MMHG | TEMPERATURE: 98 F | OXYGEN SATURATION: 100 % | SYSTOLIC BLOOD PRESSURE: 158 MMHG

## 2021-06-17 DIAGNOSIS — R07.89 CHEST PAIN, ATYPICAL: Primary | ICD-10-CM

## 2021-06-17 PROCEDURE — 80048 BASIC METABOLIC PNL TOTAL CA: CPT | Performed by: EMERGENCY MEDICINE

## 2021-06-17 PROCEDURE — 85379 FIBRIN DEGRADATION QUANT: CPT | Performed by: EMERGENCY MEDICINE

## 2021-06-17 PROCEDURE — 99284 EMERGENCY DEPT VISIT MOD MDM: CPT

## 2021-06-17 PROCEDURE — 93005 ELECTROCARDIOGRAM TRACING: CPT

## 2021-06-17 PROCEDURE — 71045 X-RAY EXAM CHEST 1 VIEW: CPT | Performed by: EMERGENCY MEDICINE

## 2021-06-17 PROCEDURE — 84484 ASSAY OF TROPONIN QUANT: CPT | Performed by: EMERGENCY MEDICINE

## 2021-06-17 PROCEDURE — 85025 COMPLETE CBC W/AUTO DIFF WBC: CPT | Performed by: EMERGENCY MEDICINE

## 2021-06-17 PROCEDURE — 36415 COLL VENOUS BLD VENIPUNCTURE: CPT

## 2021-06-17 PROCEDURE — 93010 ELECTROCARDIOGRAM REPORT: CPT | Performed by: INTERNAL MEDICINE

## 2021-06-17 RX ORDER — ACETAMINOPHEN 500 MG
1000 TABLET ORAL ONCE
Status: COMPLETED | OUTPATIENT
Start: 2021-06-17 | End: 2021-06-17

## 2021-06-17 RX ORDER — ACETAMINOPHEN 500 MG
TABLET ORAL
Status: COMPLETED
Start: 2021-06-17 | End: 2021-06-17

## 2021-06-17 NOTE — ED PROVIDER NOTES
Patient Seen in: [facility] Emergency Department      History   Patient presents with:  Chest Pain Angina    Stated Complaint: HYN/Chest Pressure    HPI/Subjective:   HPI    Patient is a 81-year-old female with a history of hypertension.   She stat Exam    Constitutional: Oriented to person, place, and time. Appears well-developed and well-nourished. HEENT:   Head: Normocephalic and atraumatic.    Right Ear: External ear normal.   Left Ear: External ear normal.   Nose: Nose normal.   Mouth/Throat: O XR CHEST AP PORTABLE  (CPT=71045)    Result Date: 6/17/2021  CONCLUSION:  1. No acute cardiopulmonary disease.     Dictated by (CST): No Shelby MD on 6/17/2021 at 6:52 PM     Finalized by (CST): No Shelby MD on 6/17/2021 at 6:57 P

## 2021-06-18 NOTE — ED QUICK NOTES
Pt provided with discharge instructions. Verbalized understanding for plan of care at home and follow up. All questions/concerns addressed prior to discharge. Iv removed, cathter intact.   Pt ambulatory out of ed with steady gait with family members

## 2021-08-13 ENCOUNTER — HOSPITAL ENCOUNTER (OUTPATIENT)
Age: 56
Discharge: HOME | End: 2021-08-13
Attending: EMERGENCY MEDICINE
Payer: COMMERCIAL

## 2021-08-13 VITALS
RESPIRATION RATE: 18 BRPM | DIASTOLIC BLOOD PRESSURE: 69 MMHG | SYSTOLIC BLOOD PRESSURE: 146 MMHG | TEMPERATURE: 98 F | OXYGEN SATURATION: 100 % | HEART RATE: 70 BPM

## 2021-08-13 DIAGNOSIS — R21 RASH OF MULTIPLE DIGITS OF BOTH HANDS: Primary | ICD-10-CM

## 2021-08-13 PROCEDURE — 99213 OFFICE O/P EST LOW 20 MIN: CPT

## 2021-08-13 RX ORDER — METHYLPREDNISOLONE 4 MG/1
TABLET ORAL
Qty: 1 EACH | Refills: 0 | Status: SHIPPED | OUTPATIENT
Start: 2021-08-13 | End: 2022-01-06 | Stop reason: ALTCHOICE

## 2021-08-13 NOTE — ED INITIAL ASSESSMENT (HPI)
Pt presents with a rash on both hands x 6 weeks. Pt states daughter diagnosed with poison ivy, had steroids and rash went away.

## 2021-08-13 NOTE — ED PROVIDER NOTES
Patient Seen in: [facility]      History   Patient presents with:  Rash Skin Problem    Stated Complaint: Poison Ivy on hands    HPI/Subjective:   HPI    48year old female with h/o htn, heart murmur who presents with suspected poison ivy to Cardiovascular:      Rate and Rhythm: Normal rate. Pulmonary:      Effort: Pulmonary effort is normal. No respiratory distress. Musculoskeletal:         General: No deformity. Normal range of motion.       Cervical back: Normal range of motion and nec

## 2021-10-20 ENCOUNTER — TELEPHONE (OUTPATIENT)
Dept: INTERNAL MEDICINE CLINIC | Facility: CLINIC | Age: 56
End: 2021-10-20

## 2021-10-20 NOTE — TELEPHONE ENCOUNTER
Patient states she has an abscess on her right bottom molar. States the crown cracked is having a lot of pain with swelling of the gum. Patient will be seeing her dentist soon, but has a lot of anxiety being at the dentist office.   Patient requesting an

## 2021-10-21 ENCOUNTER — TELEMEDICINE (OUTPATIENT)
Dept: INTERNAL MEDICINE CLINIC | Facility: CLINIC | Age: 56
End: 2021-10-21

## 2021-10-21 DIAGNOSIS — K04.7 TOOTH INFECTION: Primary | ICD-10-CM

## 2021-10-21 DIAGNOSIS — Z12.31 BREAST CANCER SCREENING BY MAMMOGRAM: ICD-10-CM

## 2021-10-21 DIAGNOSIS — Z12.11 COLON CANCER SCREENING: ICD-10-CM

## 2021-10-21 PROCEDURE — 99213 OFFICE O/P EST LOW 20 MIN: CPT | Performed by: NURSE PRACTITIONER

## 2021-10-21 RX ORDER — ALPRAZOLAM 0.5 MG/1
TABLET ORAL
Qty: 2 TABLET | Refills: 0 | Status: SHIPPED | OUTPATIENT
Start: 2021-10-21

## 2021-10-21 RX ORDER — AMOXICILLIN AND CLAVULANATE POTASSIUM 875; 125 MG/1; MG/1
1 TABLET, FILM COATED ORAL 2 TIMES DAILY
Qty: 20 TABLET | Refills: 0 | Status: SHIPPED | OUTPATIENT
Start: 2021-10-21 | End: 2021-10-31

## 2021-10-21 NOTE — TELEPHONE ENCOUNTER
Patient called to follow up on message below. Appt was advised and patient would prefer not to come in so scheduled virtual appt for today at 1pm with GAGE Ortiz. Agrees to complete e check in before appt.

## 2021-10-21 NOTE — PROGRESS NOTES
Video Check-In    Tanja Mcneil verbally consents to a Video Check-In visit on 10/21/21. Patient understands and accepts financial responsibility for any deductible, co-insurance and/or co-pays associated with this service.     Ade Carter is in [place], spoke with pt. Patient presents to the clinic with complaints of right molar tooth pain x3 weeks. Broke crown month ago did not go to the dentist due to concern about Covid19.  [facility] given Abx Augmentin but did not follow visit:    Tooth infection  Start antibiotics ASAP and take as directed with food til done. Increase fluids.    Follow up with dentist.  Take xanax 30 minutes prior to dentist. Avoid driving or operating heavy machinery and activities that require mental emilie

## 2021-12-15 DIAGNOSIS — E03.9 HYPOTHYROIDISM, UNSPECIFIED TYPE: ICD-10-CM

## 2021-12-15 RX ORDER — LEVOTHYROXINE SODIUM 0.03 MG/1
25 TABLET ORAL
Qty: 90 TABLET | Refills: 1 | Status: SHIPPED
Start: 2021-12-15 | End: 2021-12-17

## 2021-12-17 DIAGNOSIS — E03.9 HYPOTHYROIDISM, UNSPECIFIED TYPE: ICD-10-CM

## 2021-12-17 RX ORDER — LEVOTHYROXINE SODIUM 0.03 MG/1
25 TABLET ORAL
Qty: 30 TABLET | Refills: 0 | Status: SHIPPED | OUTPATIENT
Start: 2021-12-17

## 2021-12-18 NOTE — TELEPHONE ENCOUNTER
Patient needs to schedule a follow up appt in order to receive additional refills. She also needs to have her tsh checked last done over one year ago.

## 2022-01-06 ENCOUNTER — TELEPHONE (OUTPATIENT)
Dept: DERMATOLOGY CLINIC | Facility: CLINIC | Age: 57
End: 2022-01-06

## 2022-01-06 ENCOUNTER — OFFICE VISIT (OUTPATIENT)
Dept: DERMATOLOGY CLINIC | Facility: CLINIC | Age: 57
End: 2022-01-06
Payer: COMMERCIAL

## 2022-01-06 DIAGNOSIS — L30.9 HAND DERMATITIS: Primary | ICD-10-CM

## 2022-01-06 PROCEDURE — 99203 OFFICE O/P NEW LOW 30 MIN: CPT | Performed by: PHYSICIAN ASSISTANT

## 2022-01-06 RX ORDER — CLOBETASOL PROPIONATE 0.5 MG/G
1 OINTMENT TOPICAL DAILY PRN
Qty: 30 G | Refills: 2 | Status: SHIPPED | OUTPATIENT
Start: 2022-01-06

## 2022-01-06 RX ORDER — TACROLIMUS 1 MG/G
1 OINTMENT TOPICAL 2 TIMES DAILY
Qty: 60 G | Refills: 3 | Status: SHIPPED | OUTPATIENT
Start: 2022-01-06

## 2022-01-06 RX ORDER — PREDNISONE 10 MG/1
TABLET ORAL
Qty: 30 TABLET | Refills: 0 | Status: SHIPPED | OUTPATIENT
Start: 2022-01-06

## 2022-01-06 NOTE — PROGRESS NOTES
HPI:    Patient ID: Brandy Moss is a 55year old female. Patient presents with a red, painful and itchy rash on palms of both hands. Has been treated by dermatologist in the past with no success.   She was treated with oral prednisone and Medrol D BLOOD PRESSURE. 90 tablet 0   • Albuterol Sulfate HFA (PROAIR HFA) 108 (90 Base) MCG/ACT Inhalation Aero Soln Inhale 2 puffs into the lungs every 4 (four) hours as needed for Wheezing.  1 Inhaler 6   • triamcinolone acetonide 0.025 % External Lotion Apply q encounter. Meds This Visit:  Requested Prescriptions     Signed Prescriptions Disp Refills   • Clobetasol Propionate 0.05 % External Ointment 30 g 2     Sig: Apply 1 Application topically daily as needed.  Apply to affected areas 1x/day as needed   • p

## 2022-01-06 NOTE — TELEPHONE ENCOUNTER
Medication PA Requested:       tacrolimus (PROTOPIC) 0.1 % External Ointment, Apply 1 Application topically 2 (two) times daily. , Disp: 60 g, Rfl: 3     Key: [id number]                                                       Quantity:  Day Supply:2g  Sig:   DX

## 2022-01-06 NOTE — TELEPHONE ENCOUNTER
•  tacrolimus (PROTOPIC) 0.1 % External Ointment, Apply 1 Application topically 2 (two) times daily. , Disp: 60 g, Rfl: 3    Key: [id number]

## 2022-01-06 NOTE — TELEPHONE ENCOUNTER
Yes proceed. Has been on multiple rounds of medrol pack and prednisone since last summer with failure. Topical steroid cause bleeding and thinning skin.

## 2022-01-13 NOTE — TELEPHONE ENCOUNTER
Medication PA Requested:     •  tacrolimus (PROTOPIC) 0.1 % External Ointment, Apply 1 Application topically 2 (two) times daily. , Disp: 60 g, Rfl: 3     Key: [id number]                                                        Quantity:  Day Supply:2g  Sig:

## 2022-01-17 ENCOUNTER — HOSPITAL ENCOUNTER (OUTPATIENT)
Dept: MAMMOGRAPHY | Age: 57
Discharge: HOME | End: 2022-01-17
Attending: NURSE PRACTITIONER
Payer: COMMERCIAL

## 2022-01-17 DIAGNOSIS — Z12.31 BREAST CANCER SCREENING BY MAMMOGRAM: ICD-10-CM

## 2022-01-17 PROCEDURE — 77067 SCR MAMMO BI INCL CAD: CPT | Performed by: NURSE PRACTITIONER

## 2022-01-17 PROCEDURE — 77063 BREAST TOMOSYNTHESIS BI: CPT | Performed by: NURSE PRACTITIONER

## 2022-01-17 NOTE — TELEPHONE ENCOUNTER
Medication PA Requested:     •  tacrolimus (PROTOPIC) 0.1 % External Ointment, Apply 1 Application topically 2 (two) times daily. , Disp: 60 g, Rfl: 3     Key: [id number]                                                        Quantity:  Day Supply:2g  Sig:

## 2022-01-17 NOTE — TELEPHONE ENCOUNTER
Fax from [facility]    Coverage review is handled by another orginazation.  Please call [phone] auto case id: [id number]    Jarrod Kimball 3 fax in pa inbox

## 2022-01-19 NOTE — TELEPHONE ENCOUNTER
Tacrolimus oint denied.      \"Documentation that the member has a diagnosis of atopic dermatitis must be provided for the requested medication to be considered for approval.\"     Please advise

## 2022-01-19 NOTE — TELEPHONE ENCOUNTER
[address]    Regarding pa submitted for TACROLIMUS 0.1% OINTMENT

## 2022-01-20 NOTE — TELEPHONE ENCOUNTER
Patient does have hx of recurrent hand dermatitis that can be considered atopic dermatitis. Also, I was supposed to receive an update on her condition in 2 weeks. Please reach out and see how patient is doing.

## 2022-01-27 NOTE — PROGRESS NOTES
Pt informed of results and instructions to repeat mammogram in one year and to continue self breast exams and to let us know if there are any lumps or abnormality.

## 2022-01-28 ENCOUNTER — TELEPHONE (OUTPATIENT)
Dept: DERMATOLOGY CLINIC | Facility: CLINIC | Age: 57
End: 2022-01-28

## 2022-01-28 ENCOUNTER — MED REC SCAN ONLY (OUTPATIENT)
Dept: DERMATOLOGY CLINIC | Facility: CLINIC | Age: 57
End: 2022-01-28

## 2022-02-04 ENCOUNTER — OFFICE VISIT (OUTPATIENT)
Dept: DERMATOLOGY CLINIC | Facility: CLINIC | Age: 57
End: 2022-02-04
Payer: COMMERCIAL

## 2022-02-04 DIAGNOSIS — L91.8 SKIN TAG: ICD-10-CM

## 2022-02-04 DIAGNOSIS — L20.89 FLEXURAL ATOPIC DERMATITIS: Primary | ICD-10-CM

## 2022-02-04 PROCEDURE — 17110 DESTRUCTION B9 LES UP TO 14: CPT | Performed by: PHYSICIAN ASSISTANT

## 2022-02-04 PROCEDURE — 99213 OFFICE O/P EST LOW 20 MIN: CPT | Performed by: PHYSICIAN ASSISTANT

## 2022-02-04 RX ORDER — TACROLIMUS 1 MG/G
1 OINTMENT TOPICAL 2 TIMES DAILY
Qty: 60 G | Refills: 3 | Status: SHIPPED | OUTPATIENT
Start: 2022-02-04

## 2022-06-14 ENCOUNTER — TELEPHONE (OUTPATIENT)
Dept: FAMILY MEDICINE CLINIC | Facility: CLINIC | Age: 57
End: 2022-06-14

## 2022-06-14 ENCOUNTER — NURSE TRIAGE (OUTPATIENT)
Dept: INTERNAL MEDICINE CLINIC | Facility: CLINIC | Age: 57
End: 2022-06-14

## 2022-06-14 NOTE — TELEPHONE ENCOUNTER
Patient received a call she left her book at the [address] [place] [address] today. She will pick it up tomorrow 6-15-22.

## 2022-06-16 ENCOUNTER — TELEPHONE (OUTPATIENT)
Dept: INTERNAL MEDICINE CLINIC | Facility: CLINIC | Age: 57
End: 2022-06-16

## 2022-06-16 ENCOUNTER — OFFICE VISIT (OUTPATIENT)
Dept: INTERNAL MEDICINE CLINIC | Facility: CLINIC | Age: 57
End: 2022-06-16
Payer: COMMERCIAL

## 2022-06-16 VITALS
SYSTOLIC BLOOD PRESSURE: 138 MMHG | HEIGHT: 67 IN | BODY MASS INDEX: 34.03 KG/M2 | HEART RATE: 77 BPM | WEIGHT: 216.81 LBS | DIASTOLIC BLOOD PRESSURE: 74 MMHG

## 2022-06-16 DIAGNOSIS — I10 HYPERTENSION GOAL BP (BLOOD PRESSURE) < 130/80: ICD-10-CM

## 2022-06-16 DIAGNOSIS — N95.0 POSTMENOPAUSAL BLEEDING: Primary | ICD-10-CM

## 2022-06-16 DIAGNOSIS — L30.9 DERMATITIS: ICD-10-CM

## 2022-06-16 DIAGNOSIS — Z78.0 POSTMENOPAUSAL: Primary | ICD-10-CM

## 2022-06-16 LAB
APPEARANCE: CLEAR
BILIRUBIN: NEGATIVE
GLUCOSE (URINE DIPSTICK): NEGATIVE MG/DL
KETONES (URINE DIPSTICK): NEGATIVE MG/DL
LEUKOCYTES: NEGATIVE
MULTISTIX LOT#: ABNORMAL NUMERIC
NITRITE, URINE: NEGATIVE
PH, URINE: 5 (ref 4.5–8)
PROTEIN (URINE DIPSTICK): NEGATIVE MG/DL
SPECIFIC GRAVITY: 1.01 (ref 1–1.03)
URINE-COLOR: YELLOW
UROBILINOGEN,SEMI-QN: 0.2 MG/DL (ref 0–1.9)

## 2022-06-16 PROCEDURE — 3075F SYST BP GE 130 - 139MM HG: CPT | Performed by: NURSE PRACTITIONER

## 2022-06-16 PROCEDURE — 81003 URINALYSIS AUTO W/O SCOPE: CPT | Performed by: NURSE PRACTITIONER

## 2022-06-16 PROCEDURE — 3078F DIAST BP <80 MM HG: CPT | Performed by: NURSE PRACTITIONER

## 2022-06-16 PROCEDURE — 3008F BODY MASS INDEX DOCD: CPT | Performed by: NURSE PRACTITIONER

## 2022-06-16 PROCEDURE — 99215 OFFICE O/P EST HI 40 MIN: CPT | Performed by: NURSE PRACTITIONER

## 2022-06-16 RX ORDER — CLOBETASOL PROPIONATE 0.5 MG/G
1 OINTMENT TOPICAL DAILY PRN
Qty: 30 G | Refills: 0 | Status: SHIPPED | OUTPATIENT
Start: 2022-06-16

## 2022-06-16 RX ORDER — TACROLIMUS 1 MG/G
1 OINTMENT TOPICAL 2 TIMES DAILY
Qty: 60 G | Refills: 0 | Status: SHIPPED | OUTPATIENT
Start: 2022-06-16

## 2022-06-16 RX ORDER — AMLODIPINE BESYLATE 5 MG/1
5 TABLET ORAL NIGHTLY
Qty: 90 TABLET | Refills: 0 | Status: SHIPPED | OUTPATIENT
Start: 2022-06-16

## 2022-06-16 NOTE — TELEPHONE ENCOUNTER
Patient is requesting referral.     Name of specialist and specialty department : OBGYN / Dr. Hal Moody    Reason for visit with the specialist:   post Menopausal spotting     [facility] the specialist [facility]     Appointment date: pending referral           CSS informed patient the turnaround time for referral is 5-7 business days. Patient was informed to check their [employer] account for referral status.

## 2022-06-24 ENCOUNTER — TELEPHONE (OUTPATIENT)
Dept: INTERNAL MEDICINE CLINIC | Facility: CLINIC | Age: 57
End: 2022-06-24

## 2022-06-24 NOTE — TELEPHONE ENCOUNTER
Good Morning Dr Hickman Mediate and staff,    Please review pended referral for OB and add DX codes if you agree with plan of care.     Thank you    [facility]

## 2022-06-24 NOTE — TELEPHONE ENCOUNTER
Patient is requesting pap smear results advised they are still in process. Patient states she was told by Quin Aquino that would be back by Friday. Patient continues to state her concerns are not being addressed as she is postmenopausal and after five years with no period she started with abdominal cramping and spotting. Per the office visit notes labs, US pelvis, and referral to OBGYN were placed. Patient states her pap should have been put in as stat because her friend just  from cancer and she is worried. Patient upset stating Quin Aquino offered her a referral to see a therapist and she felt that was not addressing her concerns. Per office visit notes Neurological:      Mental Status: She is alert and oriented to person, place, and time. Psychiatric:         Attention and Perception: Attention normal.         Mood and Affect: Mood is anxious and depressed. Affect is tearful. Speech: Speech is rapid and pressured. Behavior: Behavior normal. Behavior is cooperative. Thought Content: Thought content normal.     Informed patient per Quin Aquino to redo the urine culture and patient states why. She feels the UA was not contaminated and feels she should not have to resubmit another specimen. Nurse called the lab Alok Lopez stated cytology is gone for the day. Per Alok Spine they have been back logged but improving as of this week. He instructed nurse to let patient know it does take a week to come back and to call back Monday before 5 pm.     Our call got disconnected, nurse was not able to dial out; supervisor was informed. Per Suzie Adams she will speak with patient.

## 2022-06-24 NOTE — TELEPHONE ENCOUNTER
Scheduling has told the patient sooner appointments are available for the US if test put in as STAT. Given patient's level of anxiety, Please advise on changing test to STAT.

## 2022-06-24 NOTE — TELEPHONE ENCOUNTER
Patient is requesting a call back to further discuss her urine results and would like to know why her pap is still in process.

## 2022-06-27 LAB — HPV I/H RISK 1 DNA SPEC QL NAA+PROBE: NEGATIVE

## 2022-06-27 NOTE — TELEPHONE ENCOUNTER
RN please contact pt. 1. Pt ultrasound changed to STAT. 2. Pt was advised during ov that pap take approx 1 week to recv results but is based on lab. 3. She was advised to see gyne but still has not made an appt. 4. These concerns were addressed during ov would like to continue to address pt concerns but I did not recv any additional pt concerns. Pt may also see her primary.

## 2022-06-27 NOTE — TELEPHONE ENCOUNTER
Spoke with patient ( verified) and relayed MISAEL Diaz's message below--patient verbalizes understanding and agreement--spoke with Jaimie Jones at [place] [facility]--she will make sure Pap and HPV are worked on ASAP, as it has been over one week--still shows in process. Spoke with MD line scheduling to assist in scheduling STAT US--only availability is OPO tomorrow--patient not available between 11 a.m-1 p.m. Called US directly--patient scheduled for US tomorrow morning at OPO locations--address provided. Patient states she did call gyne, but was told they do not want to see her until tests completed    Routed to MISAEL Shin Page as update/FYI          Future Appointments   Date Time Provider Raysa Liu   2022  9:00 AM [facility] [place]

## 2022-06-28 ENCOUNTER — TELEPHONE (OUTPATIENT)
Dept: INTERNAL MEDICINE CLINIC | Facility: CLINIC | Age: 57
End: 2022-06-28

## 2022-06-28 ENCOUNTER — HOSPITAL ENCOUNTER (OUTPATIENT)
Dept: ULTRASOUND IMAGING | Age: 57
Discharge: HOME | End: 2022-06-28
Attending: NURSE PRACTITIONER
Payer: COMMERCIAL

## 2022-06-28 DIAGNOSIS — N95.0 POSTMENOPAUSAL BLEEDING: ICD-10-CM

## 2022-06-28 PROCEDURE — 76830 TRANSVAGINAL US NON-OB: CPT | Performed by: NURSE PRACTITIONER

## 2022-06-28 PROCEDURE — 76856 US EXAM PELVIC COMPLETE: CPT | Performed by: NURSE PRACTITIONER

## 2022-09-12 DIAGNOSIS — I10 HYPERTENSION GOAL BP (BLOOD PRESSURE) < 130/80: ICD-10-CM

## 2022-09-12 RX ORDER — AMLODIPINE BESYLATE 5 MG/1
5 TABLET ORAL NIGHTLY
Qty: 90 TABLET | Refills: 0 | Status: SHIPPED | OUTPATIENT
Start: 2022-09-12

## 2022-09-12 NOTE — TELEPHONE ENCOUNTER
Protocol failed or has No Protocol, please review  Requested Prescriptions   Pending Prescriptions Disp Refills    AMLODIPINE 5 MG Oral Tab [Pharmacy Med Name: AMLODIPINE BESYLATE 5 MG TAB] 90 tablet 0     Sig: Take 1 tablet (5 mg total) by mouth nightly. FOR BLOOD PRESSURE. Hypertensive Medications Protocol Failed - 9/12/2022 12:01 AM        Failed - CMP or BMP in past 6 months     No results found for this or any previous visit (from the past 4392 hour(s)).               Failed - GFR > 50     No results found for: [facility]              Passed - In person appointment in the past 12 or next 3 months       Recent Outpatient Visits              2 months ago Postmenopausal bleeding    [address], [address], Samantha Coates APRN    Office Visit    7 months ago Flexural atopic dermatitis    [facility] [place], [place]    Office Visit    8 months ago Hand dermatitis    [facility] [place], [place]    Office Visit    10 months ago Tooth infection    [address], Samantha Foreman, [place]    Telemedicine    2 years ago Routine general medical examination at a health care facility    [address], Arturo Allen MD    Office Visit                 Passed - Last BP reading less than 140/90     BP Readings from Last 1 Encounters:  06/16/22 : 138/74                Passed - In person appointment or virtual visit in the past 6 months       Recent Outpatient Visits              2 months ago Postmenopausal bleeding    [address], [address], Samantha Coates APRN    Office Visit    7 months ago Flexural atopic dermatitis    [facility] [place], [place]    Office Visit    8 months ago Hand dermatitis    [facility] [place]    Office Visit    10 months ago Tooth infection    [address], Doreene Dakin, APRN Telemedicine    2 years ago Routine general medical examination at a health care facility    [place] [facility], [address] MD    Office Visit                       Recent Outpatient Visits              2 months ago Postmenopausal bleeding    [place] [employer] [place], [facility], [address], [place] [facility], APRN    Office Visit    7 months ago Flexural atopic dermatitis    [facility] Lucie Jackman    Office Visit    8 months ago Hand dermatitis    [facility] Jeremy Nunez PA-C    Office Visit    10 months ago Tooth infection    [place] [facility], [address], [address], APRN    Telemedicine    2 years ago Routine general medical examination at a health care facility    [place] [facility], [place] rai MD    Office Visit

## 2022-09-15 NOTE — TELEPHONE ENCOUNTER
Patient returning call and stated, \"I didn't need a refill. \" Advised she still is overdue for her physical and offered to assist with scheduling but she advised she will call us as she is out the door.

## 2022-10-11 ENCOUNTER — NURSE TRIAGE (OUTPATIENT)
Dept: INTERNAL MEDICINE CLINIC | Facility: CLINIC | Age: 57
End: 2022-10-11

## 2022-10-11 RX ORDER — AMOXICILLIN 500 MG/1
500 TABLET, FILM COATED ORAL 3 TIMES DAILY
Qty: 21 TABLET | Refills: 0 | Status: SHIPPED | OUTPATIENT
Start: 2022-10-11 | End: 2022-10-18

## 2023-10-10 NOTE — TELEPHONE ENCOUNTER
I will refill the mupirocin for the patient. She can use that once per day for the next 1 week then stop. She should continue with clobetasol and tacrolimus as well. If starting worsen then 2 times per day. Should follow-up in office next week.
LMTCB regarding Dipti's recommendations.
LOV 1/6/22 - Pt states she was using the Clobetasol and mupirocin for her hands and it was working great but she ran out of the mupirocin and her hands are getting bad again.   Pt states she only took the prednisone for 1 day because her PCP said she should
Patient is not using the steroids because she was exposed to covid and was advised by PCP to stop steroid during that time. She was using the ointments and they worked, but her hands are getting itchy and achy again.     mupirocin 2 % External Ointment  ta
Please see the attached refill request.  
Pt informed of NK's recommendations and verbalized understanding. Pt schedule for f/u appt on 2/4/22. Libertad Peña - pt very grateful for your care and states you are the first provider that has been able to help her with her skin condition on her hands.
Normal for race

## 2024-01-19 ENCOUNTER — NURSE TRIAGE (OUTPATIENT)
Dept: INTERNAL MEDICINE CLINIC | Facility: CLINIC | Age: 59
End: 2024-01-19

## 2024-01-19 LAB — AMB EXT COVID-19 RESULT: DETECTED

## 2024-01-19 NOTE — TELEPHONE ENCOUNTER
Action Requested: Summary for Provider     []  Critical Lab, Recommendations Needed  [] Need Additional Advice  []   FYI    []   Need Orders  [] Need Medications Sent to Pharmacy  []  Other     SUMMARY: Per protocol disposition advised See within 3 Days in Office. Patient advised to go to immediate care for evaluation of symptoms. Patient states she prefers virtual visit with provider as to not expose others to Covid or be exposed to other illnesses. Explained care providers take precautions to protect themselves and patients from the spread of disease and she should go in for evaluation if needed, verbalizes understanding and state she will get care if symptoms change or worsen.  Virtual visit scheduled. Patient advised to go to immediate care or ER with severe or worsening symptoms, verbalizes understanding.  Patient due for physical as well, transferred to [facility] to schedule appointment.  Future Appointments   Date Time Provider Department Center   1/20/2024  8:30 AM Bruna Harris MD [facility] [place]   2/10/2024  9:20 AM Saritha Avila APRN [facility] [place]     Reason for call: Covid and Blood Pressure  Onset: last night    Patient reports covid home test positive today- covid flag updated  Symptoms: low grade fever, runny nose, loose stool, some racing heart and occasional chest tightness-- denies these symptoms now, elevated blood pressure readings. Reports she was prescribed blood pressure medication in the past, but has been out of blood pressure medication x 1 year. Patient has blood pressure monitor at home, see recent blood pressure (BP) and heart rate (HR) readings below:  BP: 165/95 HR: 82  BP: 179/87 HR:81  BP: 192/105 HR:68  BP: 170/96 HR:68  Denies: chest pain, unsteady gait, headache, blurry vision, and protocol questions marked with \"no\"    Reason for Disposition   [1] COVID-19 diagnosed by positive lab test (e.g., PCR, rapid self-test kit) AND [2] mild symptoms (e.g., cough, fever,  others) AND [3] no complications or SOB   Systolic BP >= 160 OR Diastolic >= 100    Protocols used: Coronavirus (COVID-19) Diagnosed or [id number], Blood Pressure - High-A-OH

## 2024-01-20 ENCOUNTER — TELEMEDICINE (OUTPATIENT)
Dept: INTERNAL MEDICINE CLINIC | Facility: CLINIC | Age: 59
End: 2024-01-20

## 2024-01-20 DIAGNOSIS — I10 HYPERTENSION GOAL BP (BLOOD PRESSURE) < 130/80: ICD-10-CM

## 2024-01-20 DIAGNOSIS — U07.1 COVID-19: Primary | ICD-10-CM

## 2024-01-20 RX ORDER — AMLODIPINE BESYLATE 10 MG/1
5 TABLET ORAL NIGHTLY
Qty: 90 TABLET | Refills: 0 | Status: SHIPPED | OUTPATIENT
Start: 2024-01-20

## 2024-01-20 NOTE — PROGRESS NOTES
This is a telemedicine visit with live, interactive video and audio.     Patient understands and accepts financial responsibility for any deductible, co-insurance and/or co-pays associated with this service.    SUBJECTIVE    She scheduled video visit today due to positive COVID and elevated blood pressure.  According to the patient her  had COVID and she tested yesterday that came back positive.  She is asking for Paxlovid.  She denies any fever or chills.  She denies any body aches, no cough or shortness of breath.  She had some slight runny nose      She also states that her blood pressure was very high yesterday 200 according to her she stopped taking her blood pressure medication.  HISTORY:  Past Medical History:   Diagnosis Date    Essential hypertension     Heart murmur     x3      Past Surgical History:   Procedure Laterality Date    ERIN LOCALIZATION WIRE 1 SITE LEFT (CPT=19281)      age 20      Family History   Problem Relation Age of Onset    Ovarian Cancer Maternal Grandmother         60s    Heart Disease Maternal Grandmother         CAD    Other (Other) Paternal Grandmother         Parkinson's disease    Heart Disease Maternal Grandfather         CAD    Arthritis Father         rheumatoid      Social History     Socioeconomic History    Marital status:    Tobacco Use    Smoking status: Never    Smokeless tobacco: Never   Substance and Sexual Activity    Alcohol use: Yes     Alcohol/week: 0.0 standard drinks of alcohol     Comment: socially, beer    Drug use: No   Other Topics Concern    Caffeine Concern No     Comment: coffee, 3 cups daily        Allergies   Allergen Reactions    Grass Extracts [Gramineae Pollens] UNKNOWN    No Known Allergies OTHER (SEE COMMENTS)      Current Outpatient Medications   Medication Sig Dispense Refill    amLODIPine 10 MG Oral Tab Take 0.5 tablets (5 mg total) by mouth nightly. FOR BLOOD PRESSURE. 90 tablet 0    tacrolimus (PROTOPIC) 0.1 % External Ointment  Apply 1 Application topically 2 (two) times daily. 60 g 0    mupirocin 2 % External Ointment Apply 1 Application topically 3 (three) times daily. 30 g 0    clobetasol 0.05 % External Ointment Apply 1 Application topically daily as needed. Apply to affected areas 1x/day as needed 30 g 0    predniSONE 10 MG Oral Tab To take 4 tablets by mouth for 3 days, then 3 tablets for 3 days, then 2 tablets for 3 days then 1 tablet for 3 days. 30 tablet 0    tacrolimus (PROTOPIC) 0.1 % External Ointment Apply 1 Application topically 2 (two) times daily. 60 g 3    mupirocin 2 % External Ointment Apply 1 Application topically 3 (three) times daily. 22 g 0    levothyroxine 25 MCG Oral Tab Take 1 tablet (25 mcg total) by mouth before breakfast. 30 tablet 0    ALPRAZolam (XANAX) 0.5 MG Oral Tab Take 1 to 2 tablets 30 minutes before dentist appointment.  Do not drive while on medication. 2 tablet 0    triamcinolone acetonide 0.025 % External Lotion Apply q hs to scalp x 10 days (Patient not taking: Reported on 1/6/2022) 60 mL 0    Albuterol Sulfate HFA (PROAIR HFA) 108 (90 Base) MCG/ACT Inhalation Aero Soln Inhale 2 puffs into the lungs every 4 (four) hours as needed for Wheezing. 1 Inhaler 6     Ros:runny nose  OBJECTIVE  Physical Exam:   Awake alert oriented    ASSESSMENT & PLAN  Diagnoses and all orders for this visit:    Hypertension goal BP (blood pressure) < 130/80  -     amLODIPine 10 MG Oral Tab; Take 0.5 tablets (5 mg total) by mouth nightly. FOR BLOOD PRESSURE.    I will resume amlodipine 10 advised her to monitor blood pressure at home and follow-up in 2 to 3 weeks with PCP for blood pressure check    COVID-19,-isolation, advised to drink fluids, take Tylenol alternate with ibuprofen as needed for fever chills or bodyaches, take vitamin C    Time   10 mi  RJ MURCIA MD

## 2024-01-22 ENCOUNTER — TELEPHONE (OUTPATIENT)
Dept: INTERNAL MEDICINE CLINIC | Facility: CLINIC | Age: 59
End: 2024-01-22

## 2024-01-22 NOTE — TELEPHONE ENCOUNTER
Called patient and she said Doctor had clearly stated that she wanted her to take 10 mg Amlodipine,  but SIG and instructions at pharmacy say take 0.5    I will resume amlodipine 10 advised her to monitor blood pressure at home and follow-up in 2 to 3 weeks with PCP for blood pressure check     Please clarify and   Call patient & leave message in mailbox as she will be getting on a plane.

## 2024-01-22 NOTE — TELEPHONE ENCOUNTER
Paging    Message # [id number]         2024 12:55p   [LISA]  To:  From:  LUCAS Sharp MD:  Phone#:  ----------------------------------------------------------------------  ALFIE LEIJA 1965  HAD TELEVISIT AND WAS PRESCRIBED RX BUT IT IS NOT THE ROGT DOSAGE WANTS TO MAKE SURE IT OKAY Paged at number :  PAGE: [phone] at :  12:55

## 2024-01-22 NOTE — TELEPHONE ENCOUNTER
Left detailed voicemail (SALTY) and per message below to call back our office if has any questions. Office phone number provided with telephone hours.

## 2024-02-10 ENCOUNTER — OFFICE VISIT (OUTPATIENT)
Dept: INTERNAL MEDICINE CLINIC | Facility: CLINIC | Age: 59
End: 2024-02-10

## 2024-02-10 VITALS
OXYGEN SATURATION: 96 % | DIASTOLIC BLOOD PRESSURE: 91 MMHG | BODY MASS INDEX: 34.69 KG/M2 | HEIGHT: 67 IN | HEART RATE: 82 BPM | WEIGHT: 221 LBS | SYSTOLIC BLOOD PRESSURE: 153 MMHG

## 2024-02-10 DIAGNOSIS — Z00.00 ANNUAL PHYSICAL EXAM: Primary | ICD-10-CM

## 2024-02-10 DIAGNOSIS — I10 HYPERTENSION GOAL BP (BLOOD PRESSURE) < 130/80: ICD-10-CM

## 2024-02-10 DIAGNOSIS — Z12.4 CERVICAL CANCER SCREENING: ICD-10-CM

## 2024-02-10 DIAGNOSIS — L29.9 ITCHY SCALP: ICD-10-CM

## 2024-02-10 DIAGNOSIS — Z12.11 COLON CANCER SCREENING: ICD-10-CM

## 2024-02-10 DIAGNOSIS — M25.561 CHRONIC PAIN OF RIGHT KNEE: ICD-10-CM

## 2024-02-10 DIAGNOSIS — Z12.31 ENCOUNTER FOR SCREENING MAMMOGRAM FOR MALIGNANT NEOPLASM OF BREAST: ICD-10-CM

## 2024-02-10 DIAGNOSIS — Z71.85 VACCINE COUNSELING: ICD-10-CM

## 2024-02-10 DIAGNOSIS — G89.29 CHRONIC PAIN OF RIGHT KNEE: ICD-10-CM

## 2024-02-10 DIAGNOSIS — M79.671 RIGHT FOOT PAIN: ICD-10-CM

## 2024-02-10 PROCEDURE — 99213 OFFICE O/P EST LOW 20 MIN: CPT | Performed by: NURSE PRACTITIONER

## 2024-02-10 PROCEDURE — 3077F SYST BP >= 140 MM HG: CPT | Performed by: NURSE PRACTITIONER

## 2024-02-10 PROCEDURE — 99396 PREV VISIT EST AGE 40-64: CPT | Performed by: NURSE PRACTITIONER

## 2024-02-10 PROCEDURE — 3080F DIAST BP >= 90 MM HG: CPT | Performed by: NURSE PRACTITIONER

## 2024-02-10 PROCEDURE — 3008F BODY MASS INDEX DOCD: CPT | Performed by: NURSE PRACTITIONER

## 2024-02-10 RX ORDER — ALBUTEROL SULFATE 90 UG/1
2 AEROSOL, METERED RESPIRATORY (INHALATION) EVERY 4 HOURS PRN
Qty: 1 EACH | Refills: 1 | Status: SHIPPED | OUTPATIENT
Start: 2024-02-10

## 2024-02-10 NOTE — PROGRESS NOTES
Tanja Veras is a 58 year old female.  Chief Complaint   Patient presents with    Physical     HPI:   She presents with her annual physical exam. Patient presents with multiple health concerns.     She believes she may have plantar fascitis in the right foot. She does have pain with walking. This pain has been presents for years. She is not currently taking any medication for pain.     She is having also having right knee pain. She has pain with walking. She has not done recent imaging. She states the pain is constant. She has had pain for about 2 years.     She has a history of eczema. She is seeing a dermatologist in [place]. She has an itchy scalp. She is using cocoa butter.     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     Her BP is elevated in office. She stopped taking Norvasc due to having vertigo. She is currenlty taking hydrochlorothiazide. She does check her BP at home. She states her BP is around 140's.         Current Outpatient Medications   Medication Sig Dispense Refill    albuterol (PROAIR HFA) 108 (90 Base) MCG/ACT Inhalation Aero Soln Inhale 2 puffs into the lungs every 4 (four) hours as needed for Wheezing. 1 each 1    hydroCHLOROthiazide 12.5 MG Oral Cap Take 1 capsule (12.5 mg total) by mouth daily. 90 capsule 0    tacrolimus (PROTOPIC) 0.1 % External Ointment Apply 1 Application topically 2 (two) times daily. 60 g 0    mupirocin 2 % External Ointment Apply 1 Application topically 3 (three) times daily. 30 g 0    clobetasol 0.05 % External Ointment Apply 1 Application topically daily as needed. Apply to affected areas 1x/day as needed 30 g 0    levothyroxine 25 MCG Oral Tab Take 1 tablet (25 mcg total) by mouth before breakfast. 30 tablet 0    ALPRAZolam (XANAX) 0.5 MG Oral Tab Take 1 to 2 tablets 30 minutes before dentist appointment.  Do not drive while on medication. 2 tablet 0    triamcinolone acetonide 0.025 % External Lotion Apply q hs to scalp x 10  days (Patient not taking: Reported on 1/6/2022) 60 mL 0      Past Medical History:   Diagnosis Date    Essential hypertension     Heart murmur     x3      Past Surgical History:   Procedure Laterality Date    ERIN LOCALIZATION WIRE 1 SITE LEFT (CPT=19281)      age 20      Social History:  Social History     Socioeconomic History    Marital status:    Tobacco Use    Smoking status: Never    Smokeless tobacco: Never   Substance and Sexual Activity    Alcohol use: Yes     Alcohol/week: 0.0 standard drinks of alcohol     Comment: socially, beer    Drug use: No   Other Topics Concern    Caffeine Concern No     Comment: coffee, 3 cups daily      Family History   Problem Relation Age of Onset    Ovarian Cancer Maternal Grandmother         60s    Heart Disease Maternal Grandmother         CAD    Other (Other) Paternal Grandmother         Parkinson's disease    Heart Disease Maternal Grandfather         CAD    Arthritis Father         rheumatoid      Allergies   Allergen Reactions    Grass Extracts [Gramineae Pollens] UNKNOWN    No Known Allergies OTHER (SEE COMMENTS)        REVIEW OF SYSTEMS:     Review of Systems   Constitutional:  Negative for fever.   HENT: Negative.     Respiratory:  Negative for cough, shortness of breath and wheezing.    Cardiovascular:  Negative for chest pain.   Gastrointestinal:  Negative for abdominal pain and constipation.   Genitourinary: Negative.    Musculoskeletal:  Positive for arthralgias (right knee and right foot).   Skin:         Itchy scalp   Neurological: Negative.    Psychiatric/Behavioral: Negative.        Wt Readings from Last 5 Encounters:   02/10/24 221 lb (100.2 kg)   06/16/22 216 lb 12.8 oz (98.3 kg)   09/09/20 204 lb (92.5 kg)   08/20/20 202 lb (91.6 kg)   02/19/20 210 lb (95.3 kg)     Body mass index is 34.61 kg/m².      EXAM:   BP (!) 153/91 (BP Location: Right arm, Patient Position: Sitting, Cuff Size: adult)   Pulse 82   Ht 5' 7\" (1.702 m)   Wt 221 lb (100.2 kg)    LMP 07/06/2019 (Approximate)   SpO2 96%   BMI 34.61 kg/m²     Physical Exam  Vitals reviewed.   Constitutional:       Appearance: Normal appearance.   HENT:      Head: Normocephalic.      Right Ear: Tympanic membrane normal.      Left Ear: Tympanic membrane normal.      Nose: No congestion.      Mouth/Throat:      Pharynx: No posterior oropharyngeal erythema.   Eyes:      Extraocular Movements: Extraocular movements intact.      Pupils: Pupils are equal, round, and reactive to light.   Cardiovascular:      Rate and Rhythm: Normal rate and regular rhythm.      Pulses: Normal pulses.   Pulmonary:      Breath sounds: Normal breath sounds. No wheezing.   Abdominal:      General: Bowel sounds are normal.      Palpations: Abdomen is soft.      Tenderness: There is no abdominal tenderness.   Musculoskeletal:         General: No swelling. Normal range of motion.      Cervical back: Normal range of motion and neck supple.   Skin:     General: Skin is warm and dry.   Neurological:      Mental Status: She is alert and oriented to person, place, and time.   Psychiatric:         Mood and Affect: Mood normal.         Behavior: Behavior normal.            ASSESSMENT AND PLAN:   1. Annual physical exam  - CBC, Platelet; No Differential; Future  - Comp Metabolic Panel (14); Future  - Hemoglobin A1C [E]; Future  - TSH W Reflex To Free T4 [E]; Future  - Lipid Panel [E]; Future    2. Colon cancer screening  - colonoscopy completed in 4/2019 at [place]  - pt requesting to follow up with James     3. Encounter for screening mammogram for malignant neoplasm of breast  - [facility] CARIDAD 2D+3D SCREENING BILAT (CPT=77067/45762); Future    4. Cervical cancer screening  - Pap completed 6/2022     5. Hypertension goal BP (blood pressure) < 130/80  - BP elevated in office   - continue hydrochlorothiazide daily   - monitor BP at home and record results. Send my chart message with blood pressure readings.     6. Vaccine counseling  - received influenza  vaccine   - interested in covid-19 vaccine     7. Chronic pain of right knee  - Ortho Referral - In Network    8. Right foot pain  - ? Flantar fascitis   - dw patient foot stretches and antiinflammatories   - follow up with podiatry if symptoms persist     9. Itchy scalp  - follow up with dermatology if symptoms persist     Total time spent with patient: 40 minutes     The patient indicates understanding of these issues and agrees to the plan.  Return in about 6 months (around 8/10/2024).

## 2024-04-13 ENCOUNTER — LAB ENCOUNTER (OUTPATIENT)
Dept: LAB | Age: 59
End: 2024-04-13
Attending: NURSE PRACTITIONER
Payer: COMMERCIAL

## 2024-04-13 DIAGNOSIS — Z00.00 ANNUAL PHYSICAL EXAM: ICD-10-CM

## 2024-04-13 LAB
ALBUMIN SERPL-MCNC: 4.4 G/DL (ref 3.2–4.8)
ALBUMIN/GLOB SERPL: 1.6 {RATIO} (ref 1–2)
ALP LIVER SERPL-CCNC: 75 U/L
ALT SERPL-CCNC: 12 U/L
ANION GAP SERPL CALC-SCNC: 2 MMOL/L (ref 0–18)
AST SERPL-CCNC: 15 U/L (ref ?–34)
BILIRUB SERPL-MCNC: 0.5 MG/DL (ref 0.3–1.2)
BUN BLD-MCNC: 15 MG/DL (ref 9–23)
BUN/CREAT SERPL: 14.9 (ref 10–20)
CALCIUM BLD-MCNC: 9.9 MG/DL (ref 8.7–10.4)
CHLORIDE SERPL-SCNC: 109 MMOL/L (ref 98–112)
CHOLEST SERPL-MCNC: 189 MG/DL (ref ?–200)
CO2 SERPL-SCNC: 30 MMOL/L (ref 21–32)
CREAT BLD-MCNC: 1.01 MG/DL
DEPRECATED RDW RBC AUTO: 39.9 FL (ref 35.1–46.3)
EGFRCR SERPLBLD CKD-EPI 2021: 65 ML/MIN/1.73M2 (ref 60–?)
ERYTHROCYTE [DISTWIDTH] IN BLOOD BY AUTOMATED COUNT: 12.3 % (ref 11–15)
EST. AVERAGE GLUCOSE BLD GHB EST-MCNC: 108 MG/DL (ref 68–126)
FASTING PATIENT LIPID ANSWER: YES
FASTING STATUS PATIENT QL REPORTED: YES
GLOBULIN PLAS-MCNC: 2.8 G/DL (ref 2.8–4.4)
GLUCOSE BLD-MCNC: 96 MG/DL (ref 70–99)
HBA1C MFR BLD: 5.4 % (ref ?–5.7)
HCT VFR BLD AUTO: 38.1 %
HDLC SERPL-MCNC: 51 MG/DL (ref 40–59)
HGB BLD-MCNC: 13 G/DL
LDLC SERPL CALC-MCNC: 125 MG/DL (ref ?–100)
MCH RBC QN AUTO: 30 PG (ref 26–34)
MCHC RBC AUTO-ENTMCNC: 34.1 G/DL (ref 31–37)
MCV RBC AUTO: 87.8 FL
NONHDLC SERPL-MCNC: 138 MG/DL (ref ?–130)
OSMOLALITY SERPL CALC.SUM OF ELEC: 293 MOSM/KG (ref 275–295)
PLATELET # BLD AUTO: 327 10(3)UL (ref 150–450)
POTASSIUM SERPL-SCNC: 4.5 MMOL/L (ref 3.5–5.1)
PROT SERPL-MCNC: 7.2 G/DL (ref 5.7–8.2)
RBC # BLD AUTO: 4.34 X10(6)UL
SODIUM SERPL-SCNC: 141 MMOL/L (ref 136–145)
TRIGL SERPL-MCNC: 70 MG/DL (ref 30–149)
TSI SER-ACNC: 2.81 MIU/ML (ref 0.55–4.78)
VLDLC SERPL CALC-MCNC: 12 MG/DL (ref 0–30)
WBC # BLD AUTO: 8 X10(3) UL (ref 4–11)

## 2024-04-13 PROCEDURE — 83036 HEMOGLOBIN GLYCOSYLATED A1C: CPT

## 2024-04-13 PROCEDURE — 84443 ASSAY THYROID STIM HORMONE: CPT

## 2024-04-13 PROCEDURE — 85027 COMPLETE CBC AUTOMATED: CPT

## 2024-04-13 PROCEDURE — 80053 COMPREHEN METABOLIC PANEL: CPT

## 2024-04-13 PROCEDURE — 80061 LIPID PANEL: CPT

## 2024-04-13 PROCEDURE — 36415 COLL VENOUS BLD VENIPUNCTURE: CPT

## 2024-04-19 ENCOUNTER — TELEPHONE (OUTPATIENT)
Dept: INTERNAL MEDICINE CLINIC | Facility: CLINIC | Age: 59
End: 2024-04-19

## 2024-04-22 RX ORDER — HYDROCHLOROTHIAZIDE 12.5 MG/1
12.5 CAPSULE, GELATIN COATED ORAL DAILY
Qty: 90 CAPSULE | Refills: 0 | Status: SHIPPED | OUTPATIENT
Start: 2024-04-22

## 2024-04-22 NOTE — TELEPHONE ENCOUNTER
Please contact patient to see if she is checking her BP at home. At the time of her last office visit her BP was elevated.

## 2024-04-22 NOTE — TELEPHONE ENCOUNTER
Please review. Rx failed/no protocol.    Requested Prescriptions   Pending Prescriptions Disp Refills    HYDROCHLOROTHIAZIDE 12.5 MG Oral Cap [Pharmacy Med Name: HYDROCHLOROTHIAZIDE 12.5 MG CP] 90 capsule 0     Sig: TAKE 1 CAPSULE BY MOUTH EVERY DAY       Hypertension Medications Protocol Failed - 4/19/2024 10:24 AM        Failed - Last BP reading less than 140/90     BP Readings from Last 1 Encounters:   02/10/24 (!) 153/91               Passed - CMP or BMP in past 12 months        Passed - In person appointment or virtual visit in the past 12 mos or appointment in next 3 mos     Recent Outpatient Visits              2 months ago Annual physical exam    [facility], [facility], [place]    Office Visit    3 months ago [address], [place], MD    Telemedicine    1 year ago Postmenopausal bleeding    [facility], Rivera Devine APRN    Office Visit    2 years ago Flexural atopic dermatitis    [facility], Dipti Briggs PA-C    Office Visit    2 years ago Hand dermatitis    [facility], Dipti Briggs PA-C    Office Visit                      Passed - EGFRCR or GFRNAA > 50     GFR Evaluation  EGFRCR: 65 , resulted on 4/13/2024                 Recent Outpatient Visits              2 months ago Annual physical exam    [facility], Saritha Dailey APRN    Office Visit    3 months ago [address], [place], MD    Telemedicine    1 year ago Postmenopausal bleeding    [facility], Rivera Devine APRN    Office Visit    2 years ago Flexural atopic dermatitis    [facility], Dipti Briggs PA-C     Office Visit    2 years ago Hand dermatitis    [facility], [facility] [place] Dipti Valenzuela PA-C    Office Visit

## 2024-04-22 NOTE — TELEPHONE ENCOUNTER
Called patient in regards to message below ( identified name and  )   Patient states she has \" white coat syndrome\"  and b/p has been 138/80     Denies headaches , no vision changes    Patient states her body is Sun sensitive while taking this medication   She has to be covered up in the sun, she uses SPF 70  ( if sun touches her skin states it \" hurts\"  her skin, like a sunburn  and has been ever since she had poison Ivy a few years ago )      Asking if any recommendations for her skin?    Please advise and thank you.

## 2024-07-01 ENCOUNTER — OFFICE VISIT (OUTPATIENT)
Dept: INTERNAL MEDICINE CLINIC | Facility: CLINIC | Age: 59
End: 2024-07-01
Payer: COMMERCIAL

## 2024-07-01 VITALS
DIASTOLIC BLOOD PRESSURE: 83 MMHG | HEIGHT: 67 IN | HEART RATE: 89 BPM | RESPIRATION RATE: 16 BRPM | SYSTOLIC BLOOD PRESSURE: 143 MMHG | WEIGHT: 221 LBS | BODY MASS INDEX: 34.69 KG/M2

## 2024-07-01 DIAGNOSIS — I10 ESSENTIAL HYPERTENSION: ICD-10-CM

## 2024-07-01 DIAGNOSIS — Z12.11 COLON CANCER SCREENING: Primary | ICD-10-CM

## 2024-07-01 DIAGNOSIS — E03.9 HYPOTHYROIDISM, UNSPECIFIED TYPE: ICD-10-CM

## 2024-07-01 RX ORDER — LEVOTHYROXINE SODIUM 0.03 MG/1
25 TABLET ORAL
Qty: 90 TABLET | Refills: 3 | Status: SHIPPED | OUTPATIENT
Start: 2024-07-01

## 2024-07-01 RX ORDER — LOSARTAN POTASSIUM 25 MG/1
25 TABLET ORAL DAILY
Qty: 90 TABLET | Refills: 0 | Status: SHIPPED | OUTPATIENT
Start: 2024-07-01

## 2024-07-01 NOTE — PROGRESS NOTES
Tanja Veras is a 58 year old female.  Chief Complaint   Patient presents with    Hypertension     HPI:   She presents of follow up. She has a history of HTN and has been taking hydrochlorothiazide. She states when she is in the sun she is developing a rash. She is interested in changing the medication.     Her BP is elevated in office. She states her BP at home has been ranging -160.     She also is requesting a referral for GI for colonoscopy.     Recently had shingles this is her second time. She is not vaccinated.     Current Outpatient Medications   Medication Sig Dispense Refill    levothyroxine 25 MCG Oral Tab Take 1 tablet (25 mcg total) by mouth before breakfast. 90 tablet 3    losartan 25 MG Oral Tab Take 1 tablet (25 mg total) by mouth daily. 90 tablet 0    albuterol (PROAIR HFA) 108 (90 Base) MCG/ACT Inhalation Aero Soln Inhale 2 puffs into the lungs every 4 (four) hours as needed for Wheezing. 1 each 1    tacrolimus (PROTOPIC) 0.1 % External Ointment Apply 1 Application topically 2 (two) times daily. 60 g 0    mupirocin 2 % External Ointment Apply 1 Application topically 3 (three) times daily. 30 g 0    clobetasol 0.05 % External Ointment Apply 1 Application topically daily as needed. Apply to affected areas 1x/day as needed 30 g 0    ALPRAZolam (XANAX) 0.5 MG Oral Tab Take 1 to 2 tablets 30 minutes before dentist appointment.  Do not drive while on medication. 2 tablet 0      Past Medical History:    Essential hypertension    Heart murmur    x3      Past Surgical History:   Procedure Laterality Date    Chloe localization wire 1 site left (cpt=19281)      age 20      Social History:  Social History     Socioeconomic History    Marital status:    Tobacco Use    Smoking status: Never    Smokeless tobacco: Never   Substance and Sexual Activity    Alcohol use: Yes     Alcohol/week: 0.0 standard drinks of alcohol     Comment: socially, beer    Drug use: No   Other Topics Concern    Caffeine  Concern No     Comment: coffee, 3 cups daily      Family History   Problem Relation Age of Onset    Ovarian Cancer Maternal Grandmother         60s    Heart Disease Maternal Grandmother         CAD    Other (Other) Paternal Grandmother         Parkinson's disease    Heart Disease Maternal Grandfather         CAD    Arthritis Father         rheumatoid      Allergies   Allergen Reactions    Grass Extracts [Gramineae Pollens] UNKNOWN    No Known Allergies OTHER (SEE COMMENTS)        REVIEW OF SYSTEMS:     Review of Systems   Constitutional:  Negative for fever.   HENT: Negative.     Respiratory:  Negative for cough, shortness of breath and wheezing.    Cardiovascular:  Negative for chest pain.   Gastrointestinal: Negative.    Genitourinary: Negative.    Musculoskeletal: Negative.    Skin: Negative.    Neurological: Negative.    Psychiatric/Behavioral: Negative.        Wt Readings from Last 5 Encounters:   07/01/24 221 lb (100.2 kg)   02/10/24 221 lb (100.2 kg)   06/16/22 216 lb 12.8 oz (98.3 kg)   09/09/20 204 lb (92.5 kg)   08/20/20 202 lb (91.6 kg)     Body mass index is 34.61 kg/m².      EXAM:   /83   Pulse 89   Resp 16   Ht 5' 7\" (1.702 m)   Wt 221 lb (100.2 kg)   LMP 07/06/2019 (Approximate)   BMI 34.61 kg/m²     Physical Exam  Vitals reviewed.   Constitutional:       Appearance: Normal appearance.   HENT:      Head: Normocephalic.   Cardiovascular:      Rate and Rhythm: Normal rate and regular rhythm.      Pulses: Normal pulses.   Pulmonary:      Breath sounds: Normal breath sounds. No wheezing.   Musculoskeletal:         General: No swelling. Normal range of motion.   Skin:     General: Skin is warm and dry.   Neurological:      Mental Status: She is alert and oriented to person, place, and time.   Psychiatric:         Mood and Affect: Mood normal.         Behavior: Behavior normal.            ASSESSMENT AND PLAN:   1. Hypothyroidism, unspecified type  - TSH WNL 4/2024  - levothyroxine 25 MCG Oral  Tab; Take 1 tablet (25 mcg total) by mouth before breakfast.  Dispense: 90 tablet; Refill: 3    2. Colon cancer screening  - Gastro Referral - [facility] ([facility])    3. Essential hypertension  - DC hydrochlorothiazide  - start losartan 25mg daily  - monitor BP at home and record results   - follow up in one month to assess BP  - losartan 25 MG Oral Tab; Take 1 tablet (25 mg total) by mouth daily.  Dispense: 90 tablet; Refill: 0        The patient indicates understanding of these issues and agrees to the plan.  Return in about 1 month (around 8/1/2024).

## 2024-07-10 ENCOUNTER — HOSPITAL ENCOUNTER (OUTPATIENT)
Dept: MAMMOGRAPHY | Age: 59
Discharge: HOME | End: 2024-07-10
Attending: NURSE PRACTITIONER
Payer: COMMERCIAL

## 2024-07-10 DIAGNOSIS — Z12.31 ENCOUNTER FOR SCREENING MAMMOGRAM FOR MALIGNANT NEOPLASM OF BREAST: ICD-10-CM

## 2024-07-10 PROCEDURE — 77063 BREAST TOMOSYNTHESIS BI: CPT | Performed by: NURSE PRACTITIONER

## 2024-07-10 PROCEDURE — 77067 SCR MAMMO BI INCL CAD: CPT | Performed by: NURSE PRACTITIONER

## 2024-07-12 ENCOUNTER — PATIENT MESSAGE (OUTPATIENT)
Dept: INTERNAL MEDICINE CLINIC | Facility: CLINIC | Age: 59
End: 2024-07-12

## 2024-07-15 NOTE — TELEPHONE ENCOUNTER
From: Tanja Veras  To: Saritha Avila  Sent: 7/12/2024 1:45 PM CDT  Subject: Prescription Request: Shingles Relapse    Hello Dr. Avila,    My Shingles flared back up this morning... or maybe they never left. I'm not surprised, my stress level is high (my Dad had a stroke Monday and is still in the hospital).    They're not bad. But can you plese re-up my prescription of the Shingles medicine? I'd like it today, if possible.     Thank You! Tanja Veras    [phone]

## 2024-07-23 ENCOUNTER — LAB ENCOUNTER (OUTPATIENT)
Dept: LAB | Age: 59
End: 2024-07-23
Attending: NURSE PRACTITIONER
Payer: COMMERCIAL

## 2024-07-23 ENCOUNTER — OFFICE VISIT (OUTPATIENT)
Dept: INTERNAL MEDICINE CLINIC | Facility: CLINIC | Age: 59
End: 2024-07-23

## 2024-07-23 VITALS
OXYGEN SATURATION: 98 % | BODY MASS INDEX: 32.8 KG/M2 | DIASTOLIC BLOOD PRESSURE: 74 MMHG | HEIGHT: 67 IN | HEART RATE: 97 BPM | WEIGHT: 209 LBS | SYSTOLIC BLOOD PRESSURE: 126 MMHG

## 2024-07-23 DIAGNOSIS — I10 HYPERTENSION GOAL BP (BLOOD PRESSURE) < 130/80: ICD-10-CM

## 2024-07-23 DIAGNOSIS — R10.30 LOWER ABDOMINAL PAIN: Primary | ICD-10-CM

## 2024-07-23 DIAGNOSIS — R10.30 LOWER ABDOMINAL PAIN: ICD-10-CM

## 2024-07-23 LAB
ALBUMIN SERPL-MCNC: 5 G/DL (ref 3.2–4.8)
ALBUMIN/GLOB SERPL: 1.6 {RATIO} (ref 1–2)
ALP LIVER SERPL-CCNC: 88 U/L
ALT SERPL-CCNC: 9 U/L
ANION GAP SERPL CALC-SCNC: 7 MMOL/L (ref 0–18)
AST SERPL-CCNC: 13 U/L (ref ?–34)
BASOPHILS # BLD AUTO: 0.06 X10(3) UL (ref 0–0.2)
BASOPHILS NFR BLD AUTO: 0.6 %
BILIRUB SERPL-MCNC: 0.7 MG/DL (ref 0.3–1.2)
BILIRUB UR QL: NEGATIVE
BUN BLD-MCNC: 12 MG/DL (ref 9–23)
BUN/CREAT SERPL: 11.4 (ref 10–20)
CALCIUM BLD-MCNC: 10.1 MG/DL (ref 8.7–10.4)
CHLORIDE SERPL-SCNC: 105 MMOL/L (ref 98–112)
CO2 SERPL-SCNC: 29 MMOL/L (ref 21–32)
COLOR UR: YELLOW
CREAT BLD-MCNC: 1.05 MG/DL
DEPRECATED RDW RBC AUTO: 41.1 FL (ref 35.1–46.3)
EGFRCR SERPLBLD CKD-EPI 2021: 62 ML/MIN/1.73M2 (ref 60–?)
EOSINOPHIL # BLD AUTO: 0.23 X10(3) UL (ref 0–0.7)
EOSINOPHIL NFR BLD AUTO: 2.4 %
ERYTHROCYTE [DISTWIDTH] IN BLOOD BY AUTOMATED COUNT: 12.5 % (ref 11–15)
FASTING STATUS PATIENT QL REPORTED: YES
GLOBULIN PLAS-MCNC: 3.2 G/DL (ref 2–3.5)
GLUCOSE BLD-MCNC: 99 MG/DL (ref 70–99)
GLUCOSE UR-MCNC: NORMAL MG/DL
HCT VFR BLD AUTO: 42.4 %
HGB BLD-MCNC: 14.1 G/DL
HGB UR QL STRIP.AUTO: NEGATIVE
IMM GRANULOCYTES # BLD AUTO: 0.02 X10(3) UL (ref 0–1)
IMM GRANULOCYTES NFR BLD: 0.2 %
LEUKOCYTE ESTERASE UR QL STRIP.AUTO: 75
LYMPHOCYTES # BLD AUTO: 3.03 X10(3) UL (ref 1–4)
LYMPHOCYTES NFR BLD AUTO: 31.3 %
MCH RBC QN AUTO: 29.6 PG (ref 26–34)
MCHC RBC AUTO-ENTMCNC: 33.3 G/DL (ref 31–37)
MCV RBC AUTO: 89.1 FL
MONOCYTES # BLD AUTO: 0.6 X10(3) UL (ref 0.1–1)
MONOCYTES NFR BLD AUTO: 6.2 %
NEUTROPHILS # BLD AUTO: 5.73 X10 (3) UL (ref 1.5–7.7)
NEUTROPHILS # BLD AUTO: 5.73 X10(3) UL (ref 1.5–7.7)
NEUTROPHILS NFR BLD AUTO: 59.3 %
NITRITE UR QL STRIP.AUTO: NEGATIVE
OSMOLALITY SERPL CALC.SUM OF ELEC: 292 MOSM/KG (ref 275–295)
PH UR: 5.5 [PH] (ref 5–8)
PLATELET # BLD AUTO: 386 10(3)UL (ref 150–450)
POTASSIUM SERPL-SCNC: 4.2 MMOL/L (ref 3.5–5.1)
PROT SERPL-MCNC: 8.2 G/DL (ref 5.7–8.2)
PROT UR-MCNC: 20 MG/DL
RBC # BLD AUTO: 4.76 X10(6)UL
SODIUM SERPL-SCNC: 141 MMOL/L (ref 136–145)
SP GR UR STRIP: 1.02 (ref 1–1.03)
UROBILINOGEN UR STRIP-ACNC: NORMAL
WBC # BLD AUTO: 9.7 X10(3) UL (ref 4–11)

## 2024-07-23 PROCEDURE — 36415 COLL VENOUS BLD VENIPUNCTURE: CPT

## 2024-07-23 PROCEDURE — 3074F SYST BP LT 130 MM HG: CPT | Performed by: NURSE PRACTITIONER

## 2024-07-23 PROCEDURE — 80053 COMPREHEN METABOLIC PANEL: CPT

## 2024-07-23 PROCEDURE — 3078F DIAST BP <80 MM HG: CPT | Performed by: NURSE PRACTITIONER

## 2024-07-23 PROCEDURE — 81001 URINALYSIS AUTO W/SCOPE: CPT | Performed by: NURSE PRACTITIONER

## 2024-07-23 PROCEDURE — 87086 URINE CULTURE/COLONY COUNT: CPT | Performed by: NURSE PRACTITIONER

## 2024-07-23 PROCEDURE — 3008F BODY MASS INDEX DOCD: CPT | Performed by: NURSE PRACTITIONER

## 2024-07-23 PROCEDURE — 99214 OFFICE O/P EST MOD 30 MIN: CPT | Performed by: NURSE PRACTITIONER

## 2024-07-23 PROCEDURE — 85025 COMPLETE CBC W/AUTO DIFF WBC: CPT

## 2024-07-23 RX ORDER — CEPHALEXIN 500 MG/1
500 CAPSULE ORAL 2 TIMES DAILY
Qty: 14 CAPSULE | Refills: 0 | Status: SHIPPED | OUTPATIENT
Start: 2024-07-23 | End: 2024-07-30

## 2024-07-23 RX ORDER — TRIAMCINOLONE ACETONIDE 1 MG/G
CREAM TOPICAL 2 TIMES DAILY
COMMUNITY

## 2024-07-23 RX ORDER — HYDROCHLOROTHIAZIDE 12.5 MG/1
CAPSULE, GELATIN COATED ORAL
COMMUNITY
Start: 2024-07-13 | End: 2024-07-23 | Stop reason: ALTCHOICE

## 2024-07-23 NOTE — PROGRESS NOTES
Tanja Veras is a 58 year old female.  Chief Complaint   Patient presents with    Abdominal Pain     Lower abdominal pain and feels like she is really heavy    Varicose Veins     Has pain in middle of thighs and has been having cramping in legs     HPI:   She presents with lower abdominal pain. Pain started last week. She describes the pain as shooting and sharp. She has decreased appetite.     Last BM Sunday. She denies any nausea, vomiting or diarrhea.     She is having fatigue. She states she slept all day yesterday.     Her BP is normal in office. She was recently started on losartan.     Current Outpatient Medications   Medication Sig Dispense Refill    triamcinolone 0.1 % External Cream Apply topically 2 (two) times daily.      levothyroxine 25 MCG Oral Tab Take 1 tablet (25 mcg total) by mouth before breakfast. 90 tablet 3      Past Medical History:    Essential hypertension    Heart murmur    x3      Past Surgical History:   Procedure Laterality Date    Chloe localization wire 1 site left (cpt=19281)      age 20      Social History:  Social History     Socioeconomic History    Marital status:    Tobacco Use    Smoking status: Never    Smokeless tobacco: Never   Vaping Use    Vaping status: Never Used   Substance and Sexual Activity    Alcohol use: Yes     Alcohol/week: 0.0 standard drinks of alcohol     Comment: socially, beer    Drug use: No   Other Topics Concern    Caffeine Concern No     Comment: coffee, 3 cups daily      Family History   Problem Relation Age of Onset    Ovarian Cancer Maternal Grandmother         60s    Heart Disease Maternal Grandmother         CAD    Other (Other) Paternal Grandmother         Parkinson's disease    Heart Disease Maternal Grandfather         CAD    Arthritis Father         rheumatoid      Allergies   Allergen Reactions    Seasonal OTHER (SEE COMMENTS)     Gets allergies when in [facility]        REVIEW OF SYSTEMS:     Review of Systems   Constitutional:   Positive for fatigue. Negative for fever.   HENT: Negative.     Respiratory:  Negative for cough, shortness of breath and wheezing.    Cardiovascular:  Negative for chest pain.   Gastrointestinal:  Positive for abdominal pain. Negative for constipation, diarrhea, nausea and vomiting.   Genitourinary:  Positive for frequency. Negative for dysuria, hematuria and urgency.   Musculoskeletal: Negative.    Skin: Negative.    Neurological: Negative.    Psychiatric/Behavioral: Negative.        Wt Readings from Last 5 Encounters:   07/23/24 209 lb (94.8 kg)   07/01/24 221 lb (100.2 kg)   02/10/24 221 lb (100.2 kg)   06/16/22 216 lb 12.8 oz (98.3 kg)   09/09/20 204 lb (92.5 kg)     Body mass index is 32.73 kg/m².      EXAM:   /74   Pulse 97   Ht 5' 7\" (1.702 m)   Wt 209 lb (94.8 kg)   LMP 07/06/2019 (Approximate)   SpO2 98%   BMI 32.73 kg/m²     Physical Exam  Vitals reviewed.   Constitutional:       Appearance: Normal appearance.   HENT:      Head: Normocephalic.   Cardiovascular:      Rate and Rhythm: Normal rate and regular rhythm.      Pulses: Normal pulses.   Pulmonary:      Breath sounds: Normal breath sounds. No wheezing.   Abdominal:      General: Bowel sounds are normal.      Palpations: Abdomen is soft.      Tenderness: There is abdominal tenderness in the suprapubic area.       Musculoskeletal:         General: No swelling. Normal range of motion.   Skin:     General: Skin is warm and dry.   Neurological:      Mental Status: She is alert and oriented to person, place, and time.   Psychiatric:         Mood and Affect: Mood normal.         Behavior: Behavior normal.            ASSESSMENT AND PLAN:   1. Lower abdominal pain  ? Fibroids vs ovarian cyst  - previous US pelvis showed multiple fibroids as well as ovarian cysts.   - CBC With Differential With Platelet; Future  - Comp Metabolic Panel (14); Future  - Urinalysis with Culture Reflex  - CT ABDOMEN+PELVIS(CONTRAST ONLY)(CPT=74177); Future    2.  Hypertension goal BP (blood pressure) < 130/80  - stable in office   - CPM    Plan: dw patient if symptoms worsen to go to the ER      The patient indicates understanding of these issues and agrees to the plan.  Return for if symptoms do not resolve.

## 2024-07-24 ENCOUNTER — PATIENT MESSAGE (OUTPATIENT)
Dept: ADMINISTRATIVE | Age: 59
End: 2024-07-24

## 2024-07-24 ENCOUNTER — HOSPITAL ENCOUNTER (OUTPATIENT)
Dept: CT IMAGING | Age: 59
Discharge: HOME | End: 2024-07-24
Attending: NURSE PRACTITIONER
Payer: COMMERCIAL

## 2024-07-24 DIAGNOSIS — R10.30 LOWER ABDOMINAL PAIN: ICD-10-CM

## 2024-07-24 PROCEDURE — 74177 CT ABD & PELVIS W/CONTRAST: CPT | Performed by: NURSE PRACTITIONER

## 2024-07-24 NOTE — TELEPHONE ENCOUNTER
Patient called stating her CT scan will not be covered and she was informed she needs a prior authorization ASAP as her appointment is scheduled for today 07/24/2024 at 6pm. I transferred her call to the appropriate dept to try and get assistance ASAP.

## 2024-07-24 NOTE — TELEPHONE ENCOUNTER
CT ABDOMEN+PELVIS(CONTRAST ONLY)(CPT=74177)        Scheuled for 07/24/2024    [employer] online to , no active coverage found under the patient     Notifed pt via      Notified price estimates for followup

## 2024-07-26 ENCOUNTER — TELEPHONE (OUTPATIENT)
Dept: INTERNAL MEDICINE CLINIC | Facility: CLINIC | Age: 59
End: 2024-07-26

## 2024-07-26 RX ORDER — CIPROFLOXACIN 500 MG/1
500 TABLET, FILM COATED ORAL 2 TIMES DAILY
Qty: 20 TABLET | Refills: 0 | Status: SHIPPED | OUTPATIENT
Start: 2024-07-26 | End: 2024-08-05

## 2024-07-26 RX ORDER — METRONIDAZOLE 500 MG/1
500 TABLET ORAL EVERY 8 HOURS
Qty: 30 TABLET | Refills: 0 | Status: SHIPPED | OUTPATIENT
Start: 2024-07-26 | End: 2024-08-05

## 2024-07-26 NOTE — TELEPHONE ENCOUNTER
Patient requesting medications be sent to different pharmacy as she is out of town. Medications sent as written order.     Saritha Avila, APRN  7/25/2024  5:39 PM CDT       Please inform patient CT scan shows acute diverticulitis.  Antibiotic therapy sent to pharmacy.  She should follow a clear liquid diet and advanced as tolerated.  Go to ER if symptoms do not improve.

## 2024-07-29 ENCOUNTER — NURSE ONLY (OUTPATIENT)
Facility: CLINIC | Age: 59
End: 2024-07-29

## 2024-07-29 NOTE — PROGRESS NOTES
Contacted and spoke with patient. Due to recent diagnosis of diverticulitis, patient does not qualify for TCS. Explained the rationale for the office visit to patient and she verbalized understanding.     Office visit made with Eboni Sandoval PA-C on 8/12/2024 at 4:00 pm at [facility].

## 2024-08-12 ENCOUNTER — OFFICE VISIT (OUTPATIENT)
Facility: CLINIC | Age: 59
End: 2024-08-12

## 2024-08-12 VITALS
HEIGHT: 67 IN | SYSTOLIC BLOOD PRESSURE: 131 MMHG | HEART RATE: 86 BPM | BODY MASS INDEX: 33.43 KG/M2 | WEIGHT: 213 LBS | DIASTOLIC BLOOD PRESSURE: 82 MMHG

## 2024-08-12 DIAGNOSIS — K57.92 DIVERTICULITIS: Primary | ICD-10-CM

## 2024-08-12 DIAGNOSIS — Z12.11 COLON CANCER SCREENING: ICD-10-CM

## 2024-08-12 DIAGNOSIS — Z80.0 FAMILY HISTORY OF COLON CANCER: ICD-10-CM

## 2024-08-12 DIAGNOSIS — I10 ESSENTIAL HYPERTENSION: ICD-10-CM

## 2024-08-12 DIAGNOSIS — I10 HYPERTENSION GOAL BP (BLOOD PRESSURE) < 130/80: ICD-10-CM

## 2024-08-12 PROCEDURE — 3079F DIAST BP 80-89 MM HG: CPT | Performed by: PHYSICIAN ASSISTANT

## 2024-08-12 PROCEDURE — 3008F BODY MASS INDEX DOCD: CPT | Performed by: PHYSICIAN ASSISTANT

## 2024-08-12 PROCEDURE — 99244 OFF/OP CNSLTJ NEW/EST MOD 40: CPT | Performed by: PHYSICIAN ASSISTANT

## 2024-08-12 PROCEDURE — 3075F SYST BP GE 130 - 139MM HG: CPT | Performed by: PHYSICIAN ASSISTANT

## 2024-08-12 RX ORDER — SODIUM, POTASSIUM,MAG SULFATES 17.5-3.13G
SOLUTION, RECONSTITUTED, ORAL ORAL
Qty: 1 EACH | Refills: 0 | Status: SHIPPED | OUTPATIENT
Start: 2024-08-12

## 2024-08-12 NOTE — H&P
[facility] - Gastroenterology                                                                                                               Reason for consult:   Chief Complaint   Patient presents with    Colonoscopy Screening     History of Colon Polyps.    Diverticulitis       Requesting physician or provider: No primary care provider on file.      HPI:   Tanja Veras is a 58 year old year-old female with history of HTN, hypertriglyceridemia who presents for crc screening.  Patient most recent seen by PCP in July for abdominal pain and ultimately diagnosed with acute diverticulitis.     Acute diverticulitis- Had abdominal pain that started days before seeing primary care. She had extreme fatigue.  Was having LLQ pain.  Completed cipro/flagyl. Today feels well.   Every day BM. Brown in color. No bright red blood, no melena.     Notes diet does consistent of nuts and popcorn on a daily if not twice daily basis.       Does have intermittent GERD. Often with trigger meals like spicy or acidic foods.  she denies dysphagia, odynophagia and/or globus. she denies abdominal pain. she denies nausea and/or vomiting.  she denies recent change in appetite and/or unintentional weight loss. she denies bloating.    NSAIDS: weekly  Tobacco: none  Alcohol: 4 drinks per week   Marijuana: none  Illicit drugs: none    FH GI malignancy- brother colon cancer (diagnosed at 48)  FH celiac dz  FH liver dz  FH IBD    No history of adverse reaction to sedation  No LYSSA  No anticoagulants  No pacemaker/defibrillator  No pain medications and/or sleep aides      Last colonoscopy:[facility] 2019   Last EGD: none    Wt Readings from Last 6 Encounters:   08/12/24 213 lb (96.6 kg)   07/23/24 209 lb (94.8 kg)   07/01/24 221 lb (100.2 kg)   02/10/24 221 lb (100.2 kg)   06/16/22 216 lb 12.8 oz (98.3 kg)   09/09/20 204 lb (92.5 kg)        History, Medications, Allergies, ROS:      Past Medical History:     Essential hypertension    Heart murmur    x3      Past Surgical History:   Procedure Laterality Date    Chloe localization wire 1 site left (cpt=19281)      age 20      Family Hx:   Family History   Problem Relation Age of Onset    Ovarian Cancer Maternal Grandmother         60s    Heart Disease Maternal Grandmother         CAD    Other (Other) Paternal Grandmother         Parkinson's disease    Heart Disease Maternal Grandfather         CAD    Arthritis Father         rheumatoid      Social History:   Social History     Socioeconomic History    Marital status:    Tobacco Use    Smoking status: Never    Smokeless tobacco: Never   Vaping Use    Vaping status: Never Used   Substance and Sexual Activity    Alcohol use: Yes     Alcohol/week: 0.0 standard drinks of alcohol     Comment: socially, beer    Drug use: No   Other Topics Concern    Caffeine Concern No     Comment: coffee, 3 cups daily        Medications (Active prior to today's visit):  Current Outpatient Medications   Medication Sig Dispense Refill    Na Sulfate-K Sulfate-Mg Sulf (SUPREP BOWEL PREP KIT) 17.5-3.13-1.6 GM/177ML Oral Solution Take prep as directed by gastro office. May substitute with Trilyte/generic equivalent if needed. 1 each 0    triamcinolone 0.1 % External Cream Apply topically 2 (two) times daily.      levothyroxine 25 MCG Oral Tab Take 1 tablet (25 mcg total) by mouth before breakfast. 90 tablet 3    losartan 25 MG Oral Tab Take 1 tablet (25 mg total) by mouth daily. 90 tablet 3       Allergies:  Allergies   Allergen Reactions    Seasonal OTHER (SEE COMMENTS)     Gets allergies when in [facility]       ROS:   CONSTITUTIONAL: negative for fevers, chills, sweats and weight loss  EYES Negative for red eyes, yellow eyes, changes in vision  HEENT: Negative for dysphagia and hoarseness  RESPIRATORY: Negative for cough and shortness of breath  CARDIOVASCULAR: Negative for chest pain, palpitations  GASTROINTESTINAL: See HPI  GENITOURINARY:  Negative for dysuria and frequency  MUSCULOSKELETAL: Negative for arthralgias and myalgias  NEUROLOGICAL: Negative for dizziness and headaches  BEHAVIOR/PSYCH: Negative for anxiety and poor appetite    PHYSICAL EXAM:   Blood pressure 131/82, pulse 86, height 5' 7\" (1.702 m), weight 213 lb (96.6 kg), last menstrual period 07/06/2019.    GEN: WD/WN, NAD  HEENT: Supple symmetrical, trachea midline  CV: RRR, the extremities are warm and well perfused   LUNGS: No increased work of breathing  ABDOMEN: No scars, normal bowel sounds, soft, non-tender, non-distended no rebound or guarding, no masses, no hepatomegaly  MSK: No redness, no warmth, no swelling of joints  SKIN: No jaundice, no erythema, no rashes  HEMATOLOGIC: No bleeding, no bruising  NEURO: Alert and interactive, normal gait    Labs/Imaging/Procedures:     Patient's pertinent labs and imaging were reviewed and discussed with patient today.     Lab Results   Component Value Date    WBC 9.7 07/23/2024    RBC 4.76 07/23/2024    HGB 14.1 07/23/2024    HCT 42.4 07/23/2024    MCV 89.1 07/23/2024    MCH 29.6 07/23/2024    MCHC 33.3 07/23/2024    RDW 12.5 07/23/2024    .0 07/23/2024    MPV 8.3 08/21/2014        Lab Results   Component Value Date    GLU 99 07/23/2024    BUN 12 07/23/2024    BUNCREA 11.4 07/23/2024    CREATSERUM 1.05 (H) 07/23/2024    ANIONGAP 7 07/23/2024    GFR 59 (L) 08/26/2014    GFRNAA 80 06/17/2021    GFRAA 92 06/17/2021    CA 10.1 07/23/2024    OSMOCALC 292 07/23/2024    ALKPHO 88 07/23/2024    AST 13 07/23/2024    ALT 9 (L) 07/23/2024    BILT 0.7 07/23/2024    TP 8.2 07/23/2024    ALB 5.0 (H) 07/23/2024    GLOBULIN 3.2 07/23/2024     07/23/2024    K 4.2 07/23/2024     07/23/2024    CO2 29.0 07/23/2024        CT ABDOMEN+PELVIS(CONTRAST ONLY)(CPT=74177)    Result Date: 7/25/2024  PROCEDURE: CT ABDOMEN + PELVIS (CONTRAST ONLY) (CPT=74177)  COMPARISON: [facility] in [facility] PELVIS W EV (CPT=76856/32534),  6/28/2022, 8:53 AM.  INDICATIONS: Lower abdominal pain  TECHNIQUE: CT images of the abdomen and pelvis were obtained with non-ionic intravenous contrast material.  Automated exposure control for dose reduction was used. Adjustment of the mA and/or kV was done based on the patient's size. Use of iterative reconstruction technique for dose reduction was used.  Dose information is transmitted to the ACR (American College of Radiology) NRDR (National Radiology Data Registry) which includes the Dose Index Registry.  FINDINGS:  LIVER: Liver has normal size and contour without enhancing lesion or biliary ductal dilatation. BILIARY: The gallbladder is present.  No intra- or extrahepatic biliary ductal dilatation. SPLEEN: No enlargement or focal lesion.  STOMACH: No gastric obstruction.  Duodenum is unremarkable. PANCREAS: No lesion, fluid collection, ductal dilatation, or atrophy.  ADRENALS: No nodule or enlargement. KIDNEYS: No enhancing renal lesion or hydroureteronephrosis AORTA/VASCULAR:   No aortic aneurysm or dissection RETROPERITONEUM: No mass or enlarged adenopathy.  BOWEL/MESENTERY:  There is localized wall thickening and diverticular inflammation in the mid sigmoid colon. There is mesenteric fat stranding and free fluid around the imflamed colon, with additional diverticula seen proximal and distal to this finding.  There is no small or large bowel obstruction. Terminal ileum and appendix (image #2, series #112) are within normal limits. There is no organized focal fluid collection, evidence of bowel necrosis, or evidence of bowel perforation. ABDOMINAL WALL: No suspicious mass lesion or significant hernia. URINARY BLADDER: No visible focal wall thickening, lesion or calculus.  PELVIC NODES: No enlarged mass or adenopathy.   PELVIC ORGANS: No visible mass.  Pelvic organs appropriate for patient age.  BONES:   No significant bony lesion or fracture. LUNG BASES: Linear bands of atelectasis/scar in the lung bases.   The visualized heart has normal size with scattered coronary artery calcifications. OTHER: Negative.          CONCLUSION: Acute mild uncomplicated diverticulitis in the mid sigmoid colon.   Exam discussed with Saritha Avila on 5/24 3:43 p.m.  Dictated by (CST): Art Jordan MD on 7/25/2024 at 3:39 PM     Finalized by (CST): Art Jordan MD on 7/25/2024 at 3:43 PM                  .  ASSESSMENT/PLAN:   Tanja Veras is a 58 year old year-old female with history of HTN, hypertriglyceridemia who presents for crc screening.  Patient most recent seen by PCP in July for abdominal pain and ultimately diagnosed with acute diverticulitis.     #acute  diverticulitis   #family hx of colon cancer  #crc screening  -first episode of acute diverticulitis, completed abx now feeling well  -prior cln in 2019 at [place]  -family hx of colon cancer, denies weight loss and decreased appetite, denies brbpr   -plan for colonoscopy at this time  -All questions answered, patient acknowledged understanding    1. Schedule colonoscopy with Dr. Garcia with MAC or IV[Diagnosis: crc screening, family hx of colon cancer, diverticulitis ]    2.  bowel prep from pharmacy (split suprep)    3. Continue all medications as normal for your procedure.    4. Read all bowel prep instructions carefully. Bowel prep instructions can also be found online at:  [email or web address]     5. AVOID seeds, nuts, popcorn, raw fruits and vegetables for 3 days before procedure    6. You MAY need to go for COVID testing 72 hours before procedure. The testing team will call you a few days before your procedure to discuss with you if testing is required. If you are asked to go for COVID testing and do not completed the test, the procedure cannot be performed.     7. If you start any NEW medication after your visit today, please notify us. Certain medications (like iron or weight loss medications) will need to be held before the procedure, or the procedure  cannot be performed safely.      Orders This Visit:  No orders of the defined types were placed in this encounter.      Meds This Visit:  Requested Prescriptions     Signed Prescriptions Disp Refills    Na Sulfate-K Sulfate-Mg Sulf (SUPREP BOWEL PREP KIT) 17.5-3.13-1.6 GM/177ML Oral Solution 1 each 0     Sig: Take prep as directed by gastro office. May substitute with Trilyte/generic equivalent if needed.       Imaging & Referrals:  None    ENDOSCOPIC RISK BENEFIT DISCUSSION: I described the procedure in great detail with the patient. I discussed the risks and benefits, including but not limited to: bleeding, perforation, infection, anesthesia complications, and even death. Patient will be NPO after midnight and will have a person physically present at time of pick-up to drive patient home. Patient verbalized understanding and agrees to proceed with procedure as planned.      Eboni Sandoval PA-C   8/12/2024        This note was partially prepared using Dragon [employer] voice recognition dictation software. As a result, errors may occur. When identified, these errors have been corrected. While every attempt is made to correct errors during dictation, discrepancies may still exist.

## 2024-08-12 NOTE — PATIENT INSTRUCTIONS
1. Schedule colonoscopy with Dr. Garcia with MAC or IV[Diagnosis: crc screening, family hx of colon cancer, diverticulitis ]    2.  bowel prep from pharmacy ([employer] suprep)    3. Continue all medications as normal for your procedure.    4. Read all bowel prep instructions carefully. Bowel prep instructions can also be found online at:  [email or web address]     5. AVOID seeds, nuts, popcorn, raw fruits and vegetables for 3 days before procedure    6. You MAY need to go for COVID testing 72 hours before procedure. The testing team will call you a few days before your procedure to discuss with you if testing is required. If you are asked to go for COVID testing and do not completed the test, the procedure cannot be performed.     7. If you start any NEW medication after your visit today, please notify us. Certain medications (like iron or weight loss medications) will need to be held before the procedure, or the procedure cannot be performed safely.

## 2024-08-13 ENCOUNTER — TELEPHONE (OUTPATIENT)
Facility: CLINIC | Age: 59
End: 2024-08-13

## 2024-08-13 DIAGNOSIS — Z80.0 FAMILY HISTORY OF COLON CANCER: ICD-10-CM

## 2024-08-13 DIAGNOSIS — K57.92 DIVERTICULITIS: ICD-10-CM

## 2024-08-13 DIAGNOSIS — Z12.11 SCREENING FOR COLON CANCER: Primary | ICD-10-CM

## 2024-08-13 NOTE — TELEPHONE ENCOUNTER
Patient was seen in office on 8/12/24. Patient was offered 10/2/24, 11/20/24, and 11/27/24 for procedure. Patient first date was 11/27/24 and wanted to hold 10/2/24 pending her decision.  Patient was aware 10/2/24 date will only be on hold for 24 hours and will be scheduled for 11/27/24 if she don't respond within the 24 hour hold. Patient voiced understanding. Patient is scheduled for 11/27/24.Prep instructions sent via Sennari message.

## 2024-08-13 NOTE — TELEPHONE ENCOUNTER
Patient calling states if able to receive an email to confirm both procedures dates. And that she is added to wait list, wants to confirm she called. Please call.

## 2024-08-13 NOTE — TELEPHONE ENCOUNTER
Scheduled for:  Colonoscopy 08[address]  Provider Name:    Date:  11/27/2024  Location:  [facility]  Sedation:  MAC  Time:  7:30 am, (pt is aware that Endoscopy desk will call the day before to confirm arrival time)  Prep:  Suprep  Meds/Allergies Reconciled?: Eboni/ PA reviewed.   Diagnosis with codes: Screening for Colon Cancer Z12.11, Family History of Colon Cancer Z80.0, Diverticulitis K57.92   Was patient informed to call insurance with codes (Y/N):  Yes  Referral sent?:  Referral was sent at the time of electronic surgical scheduling.  EM or [facility]C notified?:   I sent an electronic request to Endo Scheduling and received a confirmation today.  Medication Orders: Patient is aware to NOT take iron pills, herbal meds and diet supplements for 7 days before exam. Also to NOT take any form of alcohol, recreational drugs and any forms of ED meds 24-72 hours before exam.   Misc Orders:  N/A     Further instructions given by staff: I discussed the prep instructions with the patient which she verbally understood. Provided patient with prep instructions and cancellation policy at the time of office visit.

## 2024-08-14 RX ORDER — AMLODIPINE BESYLATE 10 MG/1
5 TABLET ORAL NIGHTLY
Qty: 45 TABLET | Refills: 1 | OUTPATIENT
Start: 2024-08-14

## 2024-08-14 RX ORDER — LOSARTAN POTASSIUM 25 MG/1
25 TABLET ORAL DAILY
Qty: 90 TABLET | Refills: 3 | Status: SHIPPED | OUTPATIENT
Start: 2024-08-14

## 2024-08-14 NOTE — TELEPHONE ENCOUNTER
Refill passed per [facility] protocols.    Requested Prescriptions   Pending Prescriptions Disp Refills    losartan 25 MG Oral Tab 90 tablet 3     Sig: Take 1 tablet (25 mg total) by mouth daily.       Hypertension Medications Protocol Passed - 8/14/2024 11:18 AM        Passed - CMP or BMP in past 12 months        Passed - Last BP reading less than 140/90     BP Readings from Last 1 Encounters:   08/12/24 131/82               Passed - In person appointment or virtual visit in the past 12 mos or appointment in next 3 mos     Recent Outpatient Visits              2 days ago     [facility], Eboni Allen PA-C    Office Visit    2 weeks ago     [facility], [place]    Nurse Only    3 weeks ago Lower abdominal pain    [facility], Saritha Dailey APRN    Office Visit    1 month ago Colon cancer screening    [facility], Saritha Dailey APRN    Office Visit    6 months ago [facility] physical [facility]    [facility] Visit          Future Appointments         Provider Department Appt Notes    In 1 month DOMINIC STOKES [facility] [place] [facility] w[facility]    In 3 months DIVYA PROCEDURE [facility] [place] Colonoscopy w/MAC @[facility]                    Passed - EGFRCR or GFRNAA > 50     GFR Evaluation  EGFRCR: 62 , resulted on 7/23/2024           Refused Prescriptions Disp Refills    AMLODIPINE 10 MG Oral Tab [Pharmacy Med Name: AMLODIPINE BESYLATE 10 MG TAB] 45 tablet 1     Sig: TAKE 0.5 TABLETS (5 MG TOTAL) BY MOUTH NIGHTLY. FOR BLOOD PRESSURE.       Hypertension Medications Protocol Passed - 8/14/2024 11:18 AM        Passed - CMP or BMP in past 12 months        Passed - Last BP reading less than 140/90      BP Readings from Last 1 Encounters:   08/12/24 131/82               Passed - In person appointment or virtual visit in the past 12 mos or appointment in next 3 mos     Recent Outpatient Visits              [facility] days [facility]    Office Visit    2 weeks ago     [facility]    [facility] Only    3 weeks ago Lower abdominal pain    [facility] [place]    Office Visit    1 month ago Colon cancer screening    [facility], Saritha Dailey APRN    Office Visit    6 months ago Annual physical exam    [facility], Saritha Dailey APRN    Office Visit          Future Appointments         Provider Department Appt Notes    In 1 month DIVYA, DOMINIC [facility], [place] Colonoscopy w/MAC @[facility]    In 3 months DIVYA PROCEDURE [facility] [place] Colonoscopy w/MAC @[facility]                    Passed - EGFRCR or GFRNAA > 50     GFR Evaluation  EGFRCR: 62 , resulted on 7/23/2024

## 2024-08-14 NOTE — TELEPHONE ENCOUNTER
Spoke with patient and clarified that she is scheduled for 11/27/24. I have added patient to the Wait list specifically looking for a Thursday or a Friday with Dr. Garcia  due to her schedule.

## 2024-11-07 ENCOUNTER — NURSE TRIAGE (OUTPATIENT)
Dept: INTERNAL MEDICINE CLINIC | Facility: CLINIC | Age: 59
End: 2024-11-07

## 2024-11-07 NOTE — TELEPHONE ENCOUNTER
The call was transferred from the call center.  While triaging the patient she informed me she was in [place].   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  The patient understood.    Per the patient she has been having sharp 9/10 pains with activity in her lower abdomin.  Her abdomin is bloated.   Last night was worse.  If sitting in the chair she has not pains.  She does have a history of diverticulitis.  Denies a fever.       She is asking for antibiotics sent to the pharmacy.    I did suggest she go to the urgent care in the area.   She asked the message be sent to TIFFANY López,      Reason for Disposition   MILD TO MODERATE constant pain lasting > 2 hours    Protocols used: Abdomen Bloating and Swelling-A-OH

## 2024-11-07 NOTE — TELEPHONE ENCOUNTER
Patient needs to go to the UC to be evaluated.     I do not feel comfortable prescribing antibiotics without seeing the patient.

## 2024-11-07 NOTE — TELEPHONE ENCOUNTER
Patient contacted and informed of XIANG Avila's response as stated below. Patient and I looked up local urgent care locations near her in [place]. Patient states locations are too far. However the hospital is close to where she is staying. Will go to local ER instead for evaluation. Advised to send condition update via [employer] when able. Patient voiced understanding. No further questions/concerns at this time.

## 2024-12-19 ENCOUNTER — OFFICE VISIT (OUTPATIENT)
Dept: INTERNAL MEDICINE CLINIC | Facility: CLINIC | Age: 59
End: 2024-12-19

## 2024-12-19 VITALS
BODY MASS INDEX: 32.96 KG/M2 | RESPIRATION RATE: 16 BRPM | DIASTOLIC BLOOD PRESSURE: 90 MMHG | HEIGHT: 67 IN | SYSTOLIC BLOOD PRESSURE: 131 MMHG | HEART RATE: 76 BPM | WEIGHT: 210 LBS

## 2024-12-19 DIAGNOSIS — I10 ESSENTIAL HYPERTENSION: ICD-10-CM

## 2024-12-19 DIAGNOSIS — Z12.11 COLON CANCER SCREENING: ICD-10-CM

## 2024-12-19 DIAGNOSIS — K57.90 DIVERTICULOSIS: Primary | ICD-10-CM

## 2024-12-19 PROCEDURE — 3080F DIAST BP >= 90 MM HG: CPT | Performed by: NURSE PRACTITIONER

## 2024-12-19 PROCEDURE — 99214 OFFICE O/P EST MOD 30 MIN: CPT | Performed by: NURSE PRACTITIONER

## 2024-12-19 PROCEDURE — 3075F SYST BP GE 130 - 139MM HG: CPT | Performed by: NURSE PRACTITIONER

## 2024-12-19 PROCEDURE — 3008F BODY MASS INDEX DOCD: CPT | Performed by: NURSE PRACTITIONER

## 2024-12-19 NOTE — PROGRESS NOTES
Tanja Veras is a 59 year old female.  Chief Complaint   Patient presents with    Diverticulitis     Follow up     HPI:   She presents for follow up. She a history of diverticulosis. She had a flare-up about one month ago and ended up in the ER. She also had a flare-up in July. She is monitoring her diet and trying to avoid foods with seed or nuts.     She is due for colonoscopy. She had one scheduled for 11/27 but it was cancelled due to her having diverticulitis.     She is going to [place] next week. She is requesting a prescription for Augmentin to have have in case she has a flare up.     Currently she has no abdominal pain. No diarrhea or constipation.       Current Outpatient Medications   Medication Sig Dispense Refill    amoxicillin clavulanate 875-125 MG Oral Tab Take 1 tablet by mouth every 8 (eight) hours for 10 days. 30 tablet 0    losartan 25 MG Oral Tab Take 1 tablet (25 mg total) by mouth daily. 90 tablet 3    triamcinolone 0.1 % External Cream Apply topically 2 (two) times daily.      levothyroxine 25 MCG Oral Tab Take 1 tablet (25 mcg total) by mouth before breakfast. 90 tablet 3      Past Medical History:    Essential hypertension    Heart murmur    x3      Past Surgical History:   Procedure Laterality Date    Chloe localization wire 1 site left (cpt=19281)      age 20      Social History:  Social History     Socioeconomic History    Marital status:    Tobacco Use    Smoking status: Never    Smokeless tobacco: Never   Vaping Use    Vaping status: Never Used   Substance and Sexual Activity    Alcohol use: Yes     Alcohol/week: 0.0 standard drinks of alcohol     Comment: socially, beer    Drug use: No   Other Topics Concern    Caffeine Concern No     Comment: coffee, 3 cups daily      Family History   Problem Relation Age of Onset    Ovarian Cancer Maternal Grandmother         60s    Heart Disease Maternal Grandmother         CAD    Other (Other) Paternal Grandmother         Parkinson's  disease    Heart Disease Maternal Grandfather         CAD    Arthritis Father         rheumatoid      Allergies[1]     REVIEW OF SYSTEMS:     Review of Systems   Constitutional:  Negative for fever.   HENT: Negative.     Respiratory:  Negative for cough, shortness of breath and wheezing.    Cardiovascular:  Negative for chest pain.   Gastrointestinal:  Negative for abdominal pain, constipation, diarrhea, nausea and vomiting.   Genitourinary: Negative.    Musculoskeletal: Negative.    Skin: Negative.    Neurological: Negative.       Wt Readings from Last 5 Encounters:   12/19/24 210 lb (95.3 kg)   08/12/24 213 lb (96.6 kg)   07/23/24 209 lb (94.8 kg)   07/01/24 221 lb (100.2 kg)   02/10/24 221 lb (100.2 kg)     Body mass index is 32.89 kg/m².      EXAM:   /90 (BP Location: Right arm, Patient Position: Sitting, Cuff Size: large)   Pulse 76   Resp 16   Ht 5' 7\" (1.702 m)   Wt 210 lb (95.3 kg)   LMP 07/06/2019 (Approximate)   BMI 32.89 kg/m²     Physical Exam  Vitals reviewed.   Constitutional:       Appearance: Normal appearance.   HENT:      Head: Normocephalic.   Eyes:      Extraocular Movements: Extraocular movements intact.      Pupils: Pupils are equal, round, and reactive to light.   Cardiovascular:      Rate and Rhythm: Normal rate and regular rhythm.      Pulses: Normal pulses.   Pulmonary:      Breath sounds: Normal breath sounds. No wheezing.   Abdominal:      General: Bowel sounds are normal.      Palpations: Abdomen is soft.      Tenderness: There is no abdominal tenderness.   Musculoskeletal:         General: No swelling. Normal range of motion.      Cervical back: Normal range of motion and neck supple.   Skin:     General: Skin is warm and dry.   Neurological:      Mental Status: She is alert and oriented to person, place, and time.   Psychiatric:         Mood and Affect: Mood normal.         Behavior: Behavior normal.            ASSESSMENT AND PLAN:   1. Diverticulosis  -Discussed with  patient to follow-up with GI.  She has had 2 episodes of diverticulitis within this year.  Per patient she is monitoring her diet and avoiding foods that could be potential triggers such as fruits, nuts, popcorn.  -She is requesting a prescription for Augmentin to have on hand due to traveling to [place] next week.  - GASTRO - INTERNAL  - amoxicillin clavulanate 875-125 MG Oral Tab; Take 1 tablet by mouth every 8 (eight) hours for 10 days.  Dispense: 30 tablet; Refill: 0    2. Colon cancer screening  - GASTRO - INTERNAL    3. Essential hypertension  -Diastolic blood pressure slightly elevated in office.  Per patient she is not consistent with taking her blood pressure medication.  She is trying to manage her diet and exercise as tolerated.  Patient has lost 11 pounds since July.      The patient indicates understanding of these issues and agrees to the plan.  Return for schedule an appointment with GI Dr Garcia.         [1]   Allergies  Allergen Reactions    Seasonal OTHER (SEE COMMENTS)     Gets allergies when in [facility]

## 2025-06-03 ENCOUNTER — TELEPHONE (OUTPATIENT)
Facility: CLINIC | Age: 60
End: 2025-06-03

## 2025-06-03 DIAGNOSIS — K57.92 DIVERTICULITIS: ICD-10-CM

## 2025-06-03 DIAGNOSIS — Z80.0 FH: GI CANCER: ICD-10-CM

## 2025-06-03 DIAGNOSIS — Z12.11 COLON CANCER SCREENING: Primary | ICD-10-CM

## 2025-06-03 NOTE — TELEPHONE ENCOUNTER
Left message for the patient to call back    CSS-     If pt returns call. Please schedule a follow up appointment for her with Eboni Sandoval per her note below.    TY!!

## 2025-06-03 NOTE — TELEPHONE ENCOUNTER
Patient states that she would like to schedule her new procedure date, as she had to cancel her procedure on 11/27/2024. Patient states that she has questions about how to live her life with diverticulitis and find out about diet. Patient is not sure if she needs to schedule a follow up to get answers to her questions?

## 2025-06-04 NOTE — TELEPHONE ENCOUNTER
Schedulers:  Patient asking to reschedule procedure  Orders in 8/12/2025 encounter.    Thank you    Patient contacted and accepted below appointment with Eboni to discuss diverticulitis/diverticulosis.  Your Appointments      Monday June 09, 2025 8:30 AM  Follow Up Visit with Eboni Sandoval PA-C  [facility] ([facility]) [facility] [address] [address]  [phone]

## 2025-06-05 NOTE — TELEPHONE ENCOUNTER
Thor RN         Per patient she needs a new prep orders sent to her pharmacy     [id number] [place] - [address] AT [facility], [phone], [phone]   [address] [place] [address]   Phone: [phone] Fax: [phone]

## 2025-06-05 NOTE — TELEPHONE ENCOUNTER
Scheduled for:  Colonoscopy 63[address]  Provider Name:   per patient   Date:  Thursday, 07/31/2025   Location:  [facility] per patient   Sedation:  MAC   Time:  10AM (pt is aware ENDO will call with arrival time     Prep:  Suprep   Meds/Allergies Reconciled?:  Yes    Diagnosis with codes:   Screening for Colon Cancer Z12.11, Family History of Colon Cancer Z80.0, Diverticulitis K57.92   Was patient informed to call insurance with codes (Y/N): Yes       Referral sent?:  Referral was sent at the time of electronic surgical scheduling.    EM or [facility]C notified?:  I sent an electronic request to Endo Scheduling and received a confirmation today.       Medication Orders:  None   Misc Orders:  None     Further instructions given by staff:  I discussed the prep instructions with the patient which she verbally understood and is aware that I will send  the instructions today.via [employer]        Topical Ketoconazole Counseling: Patient counseled that this medication may cause skin irritation or allergic reactions.  In the event of skin irritation, the patient was advised to reduce the amount of the drug applied or use it less frequently.   The patient verbalized understanding of the proper use and possible adverse effects of ketoconazole.  All of the patient's questions and concerns were addressed.

## 2025-06-06 RX ORDER — SODIUM, POTASSIUM,MAG SULFATES 17.5-3.13G
SOLUTION, RECONSTITUTED, ORAL ORAL
Qty: 1 KIT | Refills: 0 | Status: SHIPPED | OUTPATIENT
Start: 2025-06-06

## 2025-06-09 ENCOUNTER — OFFICE VISIT (OUTPATIENT)
Facility: CLINIC | Age: 60
End: 2025-06-09

## 2025-06-09 VITALS
BODY MASS INDEX: 32.33 KG/M2 | HEIGHT: 67 IN | WEIGHT: 206 LBS | HEART RATE: 73 BPM | SYSTOLIC BLOOD PRESSURE: 157 MMHG | DIASTOLIC BLOOD PRESSURE: 85 MMHG

## 2025-06-09 DIAGNOSIS — Z80.0 FH: GI CANCER: ICD-10-CM

## 2025-06-09 DIAGNOSIS — K57.92 DIVERTICULITIS: Primary | ICD-10-CM

## 2025-06-09 PROCEDURE — 3077F SYST BP >= 140 MM HG: CPT | Performed by: PHYSICIAN ASSISTANT

## 2025-06-09 PROCEDURE — 99213 OFFICE O/P EST LOW 20 MIN: CPT | Performed by: PHYSICIAN ASSISTANT

## 2025-06-09 PROCEDURE — 3008F BODY MASS INDEX DOCD: CPT | Performed by: PHYSICIAN ASSISTANT

## 2025-06-09 PROCEDURE — 3079F DIAST BP 80-89 MM HG: CPT | Performed by: PHYSICIAN ASSISTANT

## 2025-06-09 NOTE — PROGRESS NOTES
[facility] - Gastroenterology                                                                                                               Reason for consult:   Chief Complaint   Patient presents with    Follow - Up       Requesting physician or provider: No primary care provider on file.      HPI:   Tanja Veras is a 59 year old year-old female with history of HTN, hypertriglyceridemia who presents for follow up.     Last seen 8/2024 for crc screening and diverticulitis. She had recurrent episodes in November 2024 and ultimately was not able to complete colonoscopy. She presents today for follow up.        Acute diverticulitis- Has been doing well since last flare on 11/2024. No further abdominal pain, nausea or vomiting. Appetite has been good. Having daily brown stool. No bright red blood or dark black stool      Has been working on weight loss. Has been adding fiber to diet.      NSAIDS: weekly  Tobacco: none  Alcohol: 4 drinks per week   Marijuana: none  Illicit drugs: none     FH GI malignancy- brother colon cancer (diagnosed at 48)  FH celiac dz  FH liver dz  FH IBD     No history of adverse reaction to sedation  No LYSSA  No anticoagulants  No pacemaker/defibrillator  No pain medications and/or sleep aides        Last colonoscopy:[facility] 2019   Last EGD: none      Wt Readings from Last 6 Encounters:   06/09/25 206 lb (93.4 kg)   12/19/24 210 lb (95.3 kg)   08/12/24 213 lb (96.6 kg)   07/23/24 209 lb (94.8 kg)   07/01/24 221 lb (100.2 kg)   02/10/24 221 lb (100.2 kg)        History, Medications, Allergies, ROS:      Past Medical History[1]   Past Surgical History[2]   Family Hx: Family History[3]   Social History: Short Social Hx on File[4]     Medications (Active prior to today's visit):  Current Medications[5]    Allergies:  Allergies[6]    ROS:   CONSTITUTIONAL: negative for fevers, chills, sweats and weight loss  EYES Negative for red eyes, yellow eyes,  changes in vision  HEENT: Negative for dysphagia and hoarseness  RESPIRATORY: Negative for cough and shortness of breath  CARDIOVASCULAR: Negative for chest pain, palpitations  GASTROINTESTINAL: See HPI  GENITOURINARY: Negative for dysuria and frequency  MUSCULOSKELETAL: Negative for arthralgias and myalgias  NEUROLOGICAL: Negative for dizziness and headaches  BEHAVIOR/PSYCH: Negative for anxiety and poor appetite    PHYSICAL EXAM:   Weight 206 lb (93.4 kg), last menstrual period 07/06/2019.    GEN: WD/WN, NAD  HEENT: Supple symmetrical, trachea midline  CV: RRR, the extremities are warm and well perfused   LUNGS: No increased work of breathing  ABDOMEN: No scars, normal bowel sounds, soft, non-tender, non-distended no rebound or guarding, no masses, no hepatomegaly  MSK: No redness, no warmth, no swelling of joints  SKIN: No jaundice, no erythema, no rashes  HEMATOLOGIC: No bleeding, no bruising  NEURO: Alert and interactive, normal gait    Labs/Imaging/Procedures:     Patient's pertinent labs and imaging were reviewed and discussed with patient today.     Lab Results   Component Value Date    WBC 9.7 07/23/2024    RBC 4.76 07/23/2024    HGB 14.1 07/23/2024    HCT 42.4 07/23/2024    MCV 89.1 07/23/2024    MCH 29.6 07/23/2024    MCHC 33.3 07/23/2024    RDW 12.5 07/23/2024    .0 07/23/2024    MPV 8.3 08/21/2014        Lab Results   Component Value Date    GLU 99 07/23/2024    BUN 12 07/23/2024    BUNCREA 11.4 07/23/2024    CREATSERUM 1.05 (H) 07/23/2024    ANIONGAP 7 07/23/2024    GFR 59 (L) 08/26/2014    GFRNAA 80 06/17/2021    GFRAA 92 06/17/2021    CA 10.1 07/23/2024    OSMOCALC 292 07/23/2024    ALKPHO 88 07/23/2024    AST 13 07/23/2024    ALT 9 (L) 07/23/2024    BILT 0.7 07/23/2024    TP 8.2 07/23/2024    ALB 5.0 (H) 07/23/2024    GLOBULIN 3.2 07/23/2024     07/23/2024    K 4.2 07/23/2024     07/23/2024    CO2 29.0 07/23/2024        No results found.          .  ASSESSMENT/PLAN:   Tanja Gonzalez  Eda is a 59 year old year-old female with history of HTN, hypertriglyceridemia who presents for follow up.     Last seen 8/2024 for crc screening and diverticulitis. She had recurrent episodes in November 2024 and ultimately was not able to complete colonoscopy. She presents today for follow up.     #hx  diverticulitis   #family hx of colon cancer  #crc screening  -has had 2 episodes of diverticulitis 7/2024, 11/2024  -prior cln in 2019 at [facility]  -family hx of colon cancer, denies weight loss and decreased appetite, denies brbpr   -plan for colonoscopy at this time  -All questions answered, patient acknowledged understanding    -is scheduled for colonoscopy for 7/31/2025    Orders This Visit:  No orders of the defined types were placed in this encounter.      Meds This Visit:  Requested Prescriptions      No prescriptions requested or ordered in this encounter       Imaging & Referrals:  None      Eboni Sandoval PA-C   6/9/2025        This note was partially prepared using Dragon Medical voice recognition dictation software. As a result, errors may occur. When identified, these errors have been corrected. While every attempt is made to correct errors during dictation, discrepancies may still exist.                                                  [1]   Past Medical History:   Essential hypertension    Heart murmur    x3   [2]   Past Surgical History:  Procedure Laterality Date    Chloe localization wire 1 site left (cpt=19281)      age 20   [3]   Family History  Problem Relation Age of Onset    Ovarian Cancer Maternal Grandmother         60s    Heart Disease Maternal Grandmother         CAD    Other (Other) Paternal Grandmother         Parkinson's disease    Heart Disease Maternal Grandfather         CAD    Arthritis Father         rheumatoid   [4]   Social History  Socioeconomic History    Marital status:    Tobacco Use    Smoking status: Never    Smokeless tobacco: Never   Vaping Use    Vaping status:  Never Used   Substance and Sexual Activity    Alcohol use: Yes     Alcohol/week: 0.0 standard drinks of alcohol     Comment: socially, beer    Drug use: No   Other Topics Concern    Caffeine Concern No     Comment: coffee, 3 cups daily   [5]   Current Outpatient Medications   Medication Sig Dispense Refill    Na Sulfate-K Sulfate-Mg Sulf (SUPREP BOWEL PREP KIT) 17.5-3.13-1.6 GM/177ML Oral Solution May substitute prescription with golytely/nulytely/gavilyte/colyte and or generic equivalent if not covered by insurance . Take bowel preparation as provided by gastroenterology office, or visit our website at [email or web address] 1 kit 0    losartan 25 MG Oral Tab Take 1 tablet (25 mg total) by mouth daily. 90 tablet 3    triamcinolone 0.1 % External Cream Apply topically 2 (two) times daily.      levothyroxine 25 MCG Oral Tab Take 1 tablet (25 mcg total) by mouth before breakfast. 90 tablet 3   [6]   Allergies  Allergen Reactions    Seasonal OTHER (SEE COMMENTS)     Gets allergies when in [facility]

## 2025-06-09 NOTE — PATIENT INSTRUCTIONS
Recommendations:  -Increase water intake to 64oz of water per day  -Increase fiber to 20-30 g per day with fruits, vegetables and whole grains  -continue to exercise [id number] mins/week  -Do not ignore urge to defecate    -If bowel habits change or constipation worsens, call our office sooner

## 2025-07-23 ENCOUNTER — TELEPHONE (OUTPATIENT)
Facility: CLINIC | Age: 60
End: 2025-07-23

## 2025-07-23 RX ORDER — MULTIVITAMIN
1 TABLET ORAL DAILY
COMMUNITY

## 2025-07-23 RX ORDER — SODIUM, POTASSIUM,MAG SULFATES 17.5-3.13G
SOLUTION, RECONSTITUTED, ORAL ORAL
Qty: 1 KIT | Refills: 0 | Status: SHIPPED | OUTPATIENT
Start: 2025-07-23

## 2025-07-23 NOTE — TELEPHONE ENCOUNTER
Patient was called to confirm upcoming procedures.  Left voice mail to return our call if  any questions regarding your procedure.  Phone number were provided for patient to call back

## 2025-07-31 ENCOUNTER — ANESTHESIA EVENT (OUTPATIENT)
Dept: ENDOSCOPY | Facility: HOSPITAL | Age: 60
End: 2025-07-31
Payer: COMMERCIAL

## 2025-07-31 ENCOUNTER — ANESTHESIA (OUTPATIENT)
Dept: ENDOSCOPY | Facility: HOSPITAL | Age: 60
End: 2025-07-31
Payer: COMMERCIAL

## 2025-07-31 ENCOUNTER — HOSPITAL ENCOUNTER (OUTPATIENT)
Facility: HOSPITAL | Age: 60
Setting detail: HOSPITAL OUTPATIENT SURGERY
Discharge: HOME | End: 2025-07-31
Attending: INTERNAL MEDICINE | Admitting: INTERNAL MEDICINE

## 2025-07-31 VITALS
DIASTOLIC BLOOD PRESSURE: 78 MMHG | WEIGHT: 210 LBS | SYSTOLIC BLOOD PRESSURE: 157 MMHG | RESPIRATION RATE: 19 BRPM | OXYGEN SATURATION: 98 % | HEART RATE: 72 BPM | BODY MASS INDEX: 32.96 KG/M2 | HEIGHT: 67 IN

## 2025-07-31 DIAGNOSIS — Z12.11 COLON CANCER SCREENING: ICD-10-CM

## 2025-07-31 DIAGNOSIS — Z80.0 FH: GI CANCER: ICD-10-CM

## 2025-07-31 DIAGNOSIS — K57.92 DIVERTICULITIS: ICD-10-CM

## 2025-07-31 PROBLEM — Z87.19 HX OF DIVERTICULITIS OF COLON: Status: ACTIVE | Noted: 2025-07-31

## 2025-07-31 PROCEDURE — 45380 COLONOSCOPY AND BIOPSY: CPT | Performed by: INTERNAL MEDICINE

## 2025-07-31 RX ORDER — SODIUM CHLORIDE, SODIUM LACTATE, POTASSIUM CHLORIDE, CALCIUM CHLORIDE 600; 310; 30; 20 MG/100ML; MG/100ML; MG/100ML; MG/100ML
INJECTION, SOLUTION INTRAVENOUS CONTINUOUS
Status: DISCONTINUED | OUTPATIENT
Start: 2025-07-31 | End: 2025-07-31

## 2025-07-31 RX ORDER — NALOXONE HYDROCHLORIDE 0.4 MG/ML
0.08 INJECTION, SOLUTION INTRAMUSCULAR; INTRAVENOUS; SUBCUTANEOUS ONCE AS NEEDED
Status: DISCONTINUED | OUTPATIENT
Start: 2025-07-31 | End: 2025-07-31

## 2025-07-31 RX ORDER — LIDOCAINE HYDROCHLORIDE 10 MG/ML
INJECTION, SOLUTION EPIDURAL; INFILTRATION; INTRACAUDAL; PERINEURAL AS NEEDED
Status: DISCONTINUED | OUTPATIENT
Start: 2025-07-31 | End: 2025-07-31 | Stop reason: SURG

## 2025-07-31 RX ADMIN — LIDOCAINE HYDROCHLORIDE 50 MG: 10 INJECTION, SOLUTION EPIDURAL; INFILTRATION; INTRACAUDAL; PERINEURAL at 09:55:00

## 2025-08-01 ENCOUNTER — RESULTS FOLLOW-UP (OUTPATIENT)
Dept: ENDOSCOPY | Facility: HOSPITAL | Age: 60
End: 2025-08-01

## (undated) DEVICE — Device

## (undated) DEVICE — TUBING SCT CLR 6FT .25IN MDVC

## (undated) DEVICE — KIT CLEAN ENDOKIT 1.1OZ GOWNX2

## (undated) DEVICE — KIT MFLD FOR SPEC COLL

## (undated) DEVICE — KIT ENDO ORCAPOD 160/180/190

## (undated) NOTE — LETTER
March 16, 2019     Tanja Veras  [place] [id number]      Dear Juarez Pack:    Below are the results from your recent visit:    HPV negative   Pap smear negative     Resulted Orders   HPV HIGH RISK , THIN PREP COLLECTION   Result Value Ref R Ordering Location:     [facility]  Received:            03/13/2019 08:50 PM                                 Street, [address]                                                             First Screen:          Grecia Shipman

## (undated) NOTE — LETTER
11/27/21        Tanja Gonzalez [address]  [address] [facility]      Dear Naomi Hunter,    Our records indicate that you have outstanding lab work and or testing that was ordered for you and has not yet been completed:  Orders Placed This Encounter

## (undated) NOTE — LETTER
March 13, 2019     Tanja Veras  [facility]  [address]      Dear Tanja:    Below are the results from your recent visit:  HPV negative    Resulted Orders   HPV HIGH RISK , THIN PREP COLLECTION   Result Value Ref Range    HPV High Risk Ne

## (undated) NOTE — LETTER
8/20/2020              Tanja Gonzalez [address]         Dear Hailee Barrientos,    This letter is to inform you that our office has made several attempts to reach you by phone without success.   We were attempting to conta